# Patient Record
Sex: FEMALE | Race: OTHER | Employment: FULL TIME | URBAN - METROPOLITAN AREA
[De-identification: names, ages, dates, MRNs, and addresses within clinical notes are randomized per-mention and may not be internally consistent; named-entity substitution may affect disease eponyms.]

---

## 2017-03-02 ENCOUNTER — LAB ENCOUNTER (OUTPATIENT)
Dept: LAB | Age: 52
End: 2017-03-02
Attending: FAMILY MEDICINE
Payer: COMMERCIAL

## 2017-03-02 ENCOUNTER — OFFICE VISIT (OUTPATIENT)
Dept: FAMILY MEDICINE CLINIC | Facility: CLINIC | Age: 52
End: 2017-03-02

## 2017-03-02 ENCOUNTER — TELEPHONE (OUTPATIENT)
Dept: FAMILY MEDICINE CLINIC | Facility: CLINIC | Age: 52
End: 2017-03-02

## 2017-03-02 VITALS
WEIGHT: 131 LBS | HEART RATE: 64 BPM | SYSTOLIC BLOOD PRESSURE: 98 MMHG | DIASTOLIC BLOOD PRESSURE: 58 MMHG | BODY MASS INDEX: 24 KG/M2

## 2017-03-02 DIAGNOSIS — G47.00 INSOMNIA, UNSPECIFIED TYPE: ICD-10-CM

## 2017-03-02 DIAGNOSIS — R23.2 HOT FLASHES: ICD-10-CM

## 2017-03-02 DIAGNOSIS — Z13.220 SCREENING, LIPID: ICD-10-CM

## 2017-03-02 DIAGNOSIS — Z63.5 DIVORCE: ICD-10-CM

## 2017-03-02 DIAGNOSIS — F41.9 ANXIETY: ICD-10-CM

## 2017-03-02 DIAGNOSIS — G47.00 INSOMNIA, UNSPECIFIED TYPE: Primary | ICD-10-CM

## 2017-03-02 LAB
ALBUMIN SERPL BCP-MCNC: 3.6 G/DL (ref 3.5–4.8)
ALBUMIN/GLOB SERPL: 1.2 {RATIO} (ref 1–2)
ALP SERPL-CCNC: 47 U/L (ref 32–100)
ALT SERPL-CCNC: 12 U/L (ref 14–54)
ANION GAP SERPL CALC-SCNC: 7 MMOL/L (ref 0–18)
AST SERPL-CCNC: 20 U/L (ref 15–41)
BASOPHILS # BLD: 0 K/UL (ref 0–0.2)
BASOPHILS NFR BLD: 1 %
BILIRUB SERPL-MCNC: 0.6 MG/DL (ref 0.3–1.2)
BUN SERPL-MCNC: 7 MG/DL (ref 8–20)
BUN/CREAT SERPL: 8.6 (ref 10–20)
CALCIUM SERPL-MCNC: 9.2 MG/DL (ref 8.5–10.5)
CHLORIDE SERPL-SCNC: 109 MMOL/L (ref 95–110)
CHOLEST SERPL-MCNC: 183 MG/DL (ref 110–200)
CO2 SERPL-SCNC: 23 MMOL/L (ref 22–32)
CREAT SERPL-MCNC: 0.81 MG/DL (ref 0.5–1.5)
EOSINOPHIL # BLD: 0.2 K/UL (ref 0–0.7)
EOSINOPHIL NFR BLD: 3 %
ERYTHROCYTE [DISTWIDTH] IN BLOOD BY AUTOMATED COUNT: 17.4 % (ref 11–15)
FSH SERPL-ACNC: 34 MIU/ML
GLOBULIN PLAS-MCNC: 2.9 G/DL (ref 2.5–3.7)
GLUCOSE SERPL-MCNC: 79 MG/DL (ref 70–99)
HCT VFR BLD AUTO: 34.7 % (ref 35–48)
HDLC SERPL-MCNC: 71 MG/DL
HGB BLD-MCNC: 10.8 G/DL (ref 12–16)
LDLC SERPL CALC-MCNC: 97 MG/DL (ref 0–99)
LH SERPL-ACNC: 69 MIU/ML
LYMPHOCYTES # BLD: 2 K/UL (ref 1–4)
LYMPHOCYTES NFR BLD: 26 %
MCH RBC QN AUTO: 24.1 PG (ref 27–32)
MCHC RBC AUTO-ENTMCNC: 31.2 G/DL (ref 32–37)
MCV RBC AUTO: 77.2 FL (ref 80–100)
MONOCYTES # BLD: 0.5 K/UL (ref 0–1)
MONOCYTES NFR BLD: 7 %
NEUTROPHILS # BLD AUTO: 5.2 K/UL (ref 1.8–7.7)
NEUTROPHILS NFR BLD: 64 %
NONHDLC SERPL-MCNC: 112 MG/DL
OSMOLALITY UR CALC.SUM OF ELEC: 285 MOSM/KG (ref 275–295)
PLATELET # BLD AUTO: 327 K/UL (ref 140–400)
PMV BLD AUTO: 9.3 FL (ref 7.4–10.3)
POTASSIUM SERPL-SCNC: 3.7 MMOL/L (ref 3.3–5.1)
PROT SERPL-MCNC: 6.5 G/DL (ref 5.9–8.4)
RBC # BLD AUTO: 4.5 M/UL (ref 3.7–5.4)
SODIUM SERPL-SCNC: 139 MMOL/L (ref 136–144)
TRIGL SERPL-MCNC: 74 MG/DL (ref 1–149)
TSH SERPL-ACNC: 0.82 UIU/ML (ref 0.34–5.6)
VIT B12 SERPL-MCNC: 308 PG/ML (ref 181–914)
WBC # BLD AUTO: 8 K/UL (ref 4–11)

## 2017-03-02 PROCEDURE — 85025 COMPLETE CBC W/AUTO DIFF WBC: CPT

## 2017-03-02 PROCEDURE — 83001 ASSAY OF GONADOTROPIN (FSH): CPT

## 2017-03-02 PROCEDURE — 82607 VITAMIN B-12: CPT

## 2017-03-02 PROCEDURE — 84443 ASSAY THYROID STIM HORMONE: CPT

## 2017-03-02 PROCEDURE — 82306 VITAMIN D 25 HYDROXY: CPT

## 2017-03-02 PROCEDURE — 99212 OFFICE O/P EST SF 10 MIN: CPT | Performed by: FAMILY MEDICINE

## 2017-03-02 PROCEDURE — 99213 OFFICE O/P EST LOW 20 MIN: CPT | Performed by: FAMILY MEDICINE

## 2017-03-02 PROCEDURE — 80061 LIPID PANEL: CPT

## 2017-03-02 PROCEDURE — 80053 COMPREHEN METABOLIC PANEL: CPT

## 2017-03-02 PROCEDURE — 36415 COLL VENOUS BLD VENIPUNCTURE: CPT

## 2017-03-02 PROCEDURE — 82671 ASSAY OF ESTROGENS: CPT

## 2017-03-02 PROCEDURE — 83002 ASSAY OF GONADOTROPIN (LH): CPT

## 2017-03-02 RX ORDER — ALPRAZOLAM 0.25 MG/1
0.25 TABLET ORAL NIGHTLY PRN
Qty: 30 TABLET | Refills: 1 | Status: SHIPPED | OUTPATIENT
Start: 2017-03-02 | End: 2018-01-23 | Stop reason: ALTCHOICE

## 2017-03-02 SDOH — SOCIAL STABILITY - SOCIAL INSECURITY: DISRUPTION OF FAMILY BY SEPARATION AND DIVORCE: Z63.5

## 2017-03-02 NOTE — PROGRESS NOTES
Dennys Zimmerman is a 54year old female. Patient presents with: Anxiety    HPI:   Reports not exercising regularly. Never went for labs - plans to go today. Reports bad side effects with the effexor - did not like it. Did have her menses last month.  Reports

## 2017-03-02 NOTE — TELEPHONE ENCOUNTER
Dr. Dale Thomson,              I received your navigation order for behavioral health services. I have reached out to your patient and left a message with my contact info.  I will continue my outreach and update you on the progress.               ANTONIO Vaughn

## 2017-03-06 LAB
25(OH)D3 SERPL-MCNC: 14.6 NG/ML
ESTRADIOL BY TMS: 251 PG/ML
ESTROGENS TOTAL CALCULATION: 358 PG/ML
ESTRONE BY TMS: 107 PG/ML

## 2017-03-08 ENCOUNTER — TELEPHONE (OUTPATIENT)
Dept: FAMILY MEDICINE CLINIC | Facility: CLINIC | Age: 52
End: 2017-03-08

## 2017-03-08 RX ORDER — FERROUS SULFATE 325(65) MG
325 TABLET ORAL
Qty: 30 TABLET | Refills: 5 | Status: SHIPPED | OUTPATIENT
Start: 2017-03-08 | End: 2018-01-31

## 2017-03-08 RX ORDER — CHOLECALCIFEROL (VITAMIN D3) 1250 MCG
1 CAPSULE ORAL WEEKLY
Qty: 4 CAPSULE | Refills: 5 | Status: SHIPPED | OUTPATIENT
Start: 2017-03-08 | End: 2018-01-31

## 2017-03-08 NOTE — TELEPHONE ENCOUNTER
----- Message from Jennifer Brizuela MD sent at 3/8/2017  1:14 PM CST -----  Hormone levels are normal - pt is not in menopause. Still anemic. Should be taking ferrous sulfate daily to increase levels and still low vit d - restart weekly vitamin D.  Rest of

## 2017-03-08 NOTE — PROGRESS NOTES
Quick Note:    Hormone levels are normal - pt is not in menopause. Still anemic. Should be taking ferrous sulfate daily to increase levels and still low vit d - restart weekly vitamin D.  Rest of labs were normal. Normal cholesterol.  ______

## 2017-12-11 ENCOUNTER — HOSPITAL ENCOUNTER (OUTPATIENT)
Age: 52
Discharge: HOME | End: 2017-12-11
Attending: FAMILY MEDICINE
Payer: COMMERCIAL

## 2017-12-11 ENCOUNTER — APPOINTMENT (OUTPATIENT)
Dept: GENERAL RADIOLOGY | Age: 52
End: 2017-12-11
Attending: FAMILY MEDICINE
Payer: COMMERCIAL

## 2017-12-11 VITALS
SYSTOLIC BLOOD PRESSURE: 119 MMHG | TEMPERATURE: 98 F | WEIGHT: 130 LBS | OXYGEN SATURATION: 100 % | BODY MASS INDEX: 24.55 KG/M2 | HEIGHT: 61 IN | RESPIRATION RATE: 18 BRPM | HEART RATE: 74 BPM | DIASTOLIC BLOOD PRESSURE: 77 MMHG

## 2017-12-11 DIAGNOSIS — N30.00 ACUTE CYSTITIS WITHOUT HEMATURIA: Primary | ICD-10-CM

## 2017-12-11 DIAGNOSIS — R68.89 FLU-LIKE SYMPTOMS: ICD-10-CM

## 2017-12-11 DIAGNOSIS — R05.9 COUGH: ICD-10-CM

## 2017-12-11 PROCEDURE — 71020 XR CHEST PA + LAT CHEST (CPT=71020): CPT | Performed by: FAMILY MEDICINE

## 2017-12-11 PROCEDURE — 81002 URINALYSIS NONAUTO W/O SCOPE: CPT

## 2017-12-11 PROCEDURE — 87186 SC STD MICRODIL/AGAR DIL: CPT | Performed by: FAMILY MEDICINE

## 2017-12-11 PROCEDURE — 87077 CULTURE AEROBIC IDENTIFY: CPT | Performed by: FAMILY MEDICINE

## 2017-12-11 PROCEDURE — 99204 OFFICE O/P NEW MOD 45 MIN: CPT

## 2017-12-11 PROCEDURE — 99214 OFFICE O/P EST MOD 30 MIN: CPT

## 2017-12-11 PROCEDURE — 87086 URINE CULTURE/COLONY COUNT: CPT | Performed by: FAMILY MEDICINE

## 2017-12-11 RX ORDER — NITROFURANTOIN 25; 75 MG/1; MG/1
100 CAPSULE ORAL 2 TIMES DAILY
Qty: 14 CAPSULE | Refills: 0 | Status: SHIPPED | OUTPATIENT
Start: 2017-12-11 | End: 2017-12-18

## 2017-12-11 NOTE — ED INITIAL ASSESSMENT (HPI)
REPORTS CHILLS, BODY ACHES, INTERMITTENT HEADACHES AND \"FEELING TIRED\" SINCE Friday. DENIES SORE THROAT. DENIES COUGH.  + POOR APPETITE.

## 2017-12-11 NOTE — ED PROVIDER NOTES
Patient Seen in: [facility]    History   Patient presents with:   Body ache and/or chills  Headache (neurologic)    Stated Complaint: CHILLS,BODYACHES    HPI    45-year-old female presents to the clinic today with chief co reviewed and negative except as noted above.     Physical Exam   ED Triage Vitals [12/11/17 1338]  BP: 119/77  Pulse: 74  Resp: 18  Temp: 97.7 °F (36.5 °C)  Temp src: Oral  SpO2: 100 %  O2 Device: None (Room air)    Current:/77   Pulse 74   Temp 97.7 MEDIAST/LOUISE: No visible mass or adenopathy. LUNGS/PLEURA: Normal.  No significant pulmonary parenchymal abnormalities. No effusion or pleural thickening. BONES: There is mild scoliosis. OTHER: Negative. Dictated by (CST):  Benjamin Em MD on 12/1

## 2018-01-23 ENCOUNTER — OFFICE VISIT (OUTPATIENT)
Dept: FAMILY MEDICINE CLINIC | Facility: CLINIC | Age: 53
End: 2018-01-23

## 2018-01-23 VITALS
HEIGHT: 61.5 IN | WEIGHT: 130.81 LBS | HEART RATE: 73 BPM | DIASTOLIC BLOOD PRESSURE: 68 MMHG | BODY MASS INDEX: 24.38 KG/M2 | SYSTOLIC BLOOD PRESSURE: 113 MMHG

## 2018-01-23 DIAGNOSIS — Z12.31 BREAST CANCER SCREENING BY MAMMOGRAM: ICD-10-CM

## 2018-01-23 DIAGNOSIS — R23.2 HOT FLASHES: ICD-10-CM

## 2018-01-23 DIAGNOSIS — Z12.11 SCREEN FOR COLON CANCER: ICD-10-CM

## 2018-01-23 DIAGNOSIS — Z00.00 ROUTINE MEDICAL EXAM: Primary | ICD-10-CM

## 2018-01-23 PROCEDURE — 90471 IMMUNIZATION ADMIN: CPT | Performed by: FAMILY MEDICINE

## 2018-01-23 PROCEDURE — 99396 PREV VISIT EST AGE 40-64: CPT | Performed by: FAMILY MEDICINE

## 2018-01-23 PROCEDURE — 90715 TDAP VACCINE 7 YRS/> IM: CPT | Performed by: FAMILY MEDICINE

## 2018-01-23 RX ORDER — FLUOXETINE 20 MG/1
20 TABLET, FILM COATED ORAL EVERY EVENING
Qty: 30 TABLET | Refills: 2 | Status: SHIPPED | OUTPATIENT
Start: 2018-01-23 | End: 2018-05-15

## 2018-01-23 NOTE — PROGRESS NOTES
HPI:   Aurea Singer is a 50year old female who presents for a complete physical exam.    Reports for about a month had problems with insomnia and had a lot sweats in the evenings. LMP about a month. Been skipping months.  Reports the sweats are horrible bruising  ENDOCRINE: denies weight changes  ALL/ASTHMA: denies hx of allergy or asthma    EXAM:   /68 (BP Location: Left arm)   Pulse 73   Ht 5' 1.5\" (1.562 m)   Wt 130 lb 12.8 oz (59.3 kg)   LMP 12/23/2017 (Approximate)   BMI 24.31 kg/m²     GENERA

## 2018-01-27 ENCOUNTER — LAB ENCOUNTER (OUTPATIENT)
Dept: LAB | Age: 53
End: 2018-01-27
Attending: FAMILY MEDICINE
Payer: COMMERCIAL

## 2018-01-27 DIAGNOSIS — Z00.00 ROUTINE MEDICAL EXAM: ICD-10-CM

## 2018-01-27 LAB
ALBUMIN SERPL BCP-MCNC: 3.7 G/DL (ref 3.5–4.8)
ALBUMIN/GLOB SERPL: 1.2 {RATIO} (ref 1–2)
ALP SERPL-CCNC: 54 U/L (ref 32–100)
ALT SERPL-CCNC: 14 U/L (ref 14–54)
ANION GAP SERPL CALC-SCNC: 6 MMOL/L (ref 0–18)
AST SERPL-CCNC: 21 U/L (ref 15–41)
BASOPHILS # BLD: 0 K/UL (ref 0–0.2)
BASOPHILS NFR BLD: 1 %
BILIRUB SERPL-MCNC: 0.4 MG/DL (ref 0.3–1.2)
BUN SERPL-MCNC: 7 MG/DL (ref 8–20)
BUN/CREAT SERPL: 11.3 (ref 10–20)
CALCIUM SERPL-MCNC: 9 MG/DL (ref 8.5–10.5)
CHLORIDE SERPL-SCNC: 110 MMOL/L (ref 95–110)
CHOLEST SERPL-MCNC: 184 MG/DL (ref 110–200)
CO2 SERPL-SCNC: 23 MMOL/L (ref 22–32)
CREAT SERPL-MCNC: 0.62 MG/DL (ref 0.5–1.5)
EOSINOPHIL # BLD: 0.3 K/UL (ref 0–0.7)
EOSINOPHIL NFR BLD: 5 %
ERYTHROCYTE [DISTWIDTH] IN BLOOD BY AUTOMATED COUNT: 17.7 % (ref 11–15)
GLOBULIN PLAS-MCNC: 3 G/DL (ref 2.5–3.7)
GLUCOSE SERPL-MCNC: 76 MG/DL (ref 70–99)
HCT VFR BLD AUTO: 35.1 % (ref 35–48)
HDLC SERPL-MCNC: 73 MG/DL
HGB BLD-MCNC: 11 G/DL (ref 12–16)
LDLC SERPL CALC-MCNC: 99 MG/DL (ref 0–99)
LYMPHOCYTES # BLD: 2.2 K/UL (ref 1–4)
LYMPHOCYTES NFR BLD: 45 %
MCH RBC QN AUTO: 24.5 PG (ref 27–32)
MCHC RBC AUTO-ENTMCNC: 31.5 G/DL (ref 32–37)
MCV RBC AUTO: 77.8 FL (ref 80–100)
MONOCYTES # BLD: 0.4 K/UL (ref 0–1)
MONOCYTES NFR BLD: 9 %
NEUTROPHILS # BLD AUTO: 2 K/UL (ref 1.8–7.7)
NEUTROPHILS NFR BLD: 41 %
NONHDLC SERPL-MCNC: 111 MG/DL
OSMOLALITY UR CALC.SUM OF ELEC: 285 MOSM/KG (ref 275–295)
PLATELET # BLD AUTO: 306 K/UL (ref 140–400)
PMV BLD AUTO: 9.9 FL (ref 7.4–10.3)
POTASSIUM SERPL-SCNC: 3.8 MMOL/L (ref 3.3–5.1)
PROT SERPL-MCNC: 6.7 G/DL (ref 5.9–8.4)
RBC # BLD AUTO: 4.51 M/UL (ref 3.7–5.4)
SODIUM SERPL-SCNC: 139 MMOL/L (ref 136–144)
TRIGL SERPL-MCNC: 62 MG/DL (ref 1–149)
TSH SERPL-ACNC: 1.04 UIU/ML (ref 0.45–5.33)
VIT B12 SERPL-MCNC: 291 PG/ML (ref 181–914)
WBC # BLD AUTO: 5 K/UL (ref 4–11)

## 2018-01-27 PROCEDURE — 80061 LIPID PANEL: CPT

## 2018-01-27 PROCEDURE — 84443 ASSAY THYROID STIM HORMONE: CPT

## 2018-01-27 PROCEDURE — 36415 COLL VENOUS BLD VENIPUNCTURE: CPT

## 2018-01-27 PROCEDURE — 82607 VITAMIN B-12: CPT

## 2018-01-27 PROCEDURE — 82306 VITAMIN D 25 HYDROXY: CPT

## 2018-01-27 PROCEDURE — 85025 COMPLETE CBC W/AUTO DIFF WBC: CPT

## 2018-01-27 PROCEDURE — 80053 COMPREHEN METABOLIC PANEL: CPT

## 2018-01-29 ENCOUNTER — OFFICE VISIT (OUTPATIENT)
Dept: OBGYN CLINIC | Facility: CLINIC | Age: 53
End: 2018-01-29

## 2018-01-29 VITALS
DIASTOLIC BLOOD PRESSURE: 67 MMHG | HEIGHT: 62 IN | HEART RATE: 78 BPM | WEIGHT: 130 LBS | SYSTOLIC BLOOD PRESSURE: 106 MMHG | BODY MASS INDEX: 23.92 KG/M2

## 2018-01-29 DIAGNOSIS — N95.1 SYMPTOMATIC MENOPAUSAL OR FEMALE CLIMACTERIC STATES: ICD-10-CM

## 2018-01-29 DIAGNOSIS — Z01.419 ENCOUNTER FOR GYNECOLOGICAL EXAMINATION WITHOUT ABNORMAL FINDING: Primary | ICD-10-CM

## 2018-01-29 LAB — 25(OH)D3 SERPL-MCNC: 14.7 NG/ML

## 2018-01-29 PROCEDURE — 99396 PREV VISIT EST AGE 40-64: CPT | Performed by: OBSTETRICS & GYNECOLOGY

## 2018-01-29 NOTE — PROGRESS NOTES
Well Woman Exam    HPI:  The patient is a 54yo female who presents for annual exam. She states she has started to have night sweats about three times a night and it is hard to sleep. She has hot flushes during the day about 2 times.  She states it is more t Disp: 4 capsule, Rfl: 5  •  Ferrous Sulfate 325 (65 Fe) MG Oral Tab, Take 1 tablet (325 mg total) by mouth daily with breakfast., Disp: 30 tablet, Rfl: 5    ALLERGIES:  No Known Allergies      Review of Systems:  Constitutional:  Denies fevers and chills 2019  2. Menopause  1. Reviewed symptoms and periods may be irregular  2. Discussed HRT as well as other medications  3. Plan for patient to try exercise, fans, layers and return in three months. Can discuss HRT at that time again if needed.    3. Breast He

## 2018-01-31 RX ORDER — CHOLECALCIFEROL (VITAMIN D3) 1250 MCG
1 CAPSULE ORAL WEEKLY
Qty: 4 CAPSULE | Refills: 5 | Status: SHIPPED | OUTPATIENT
Start: 2018-01-31 | End: 2019-06-01 | Stop reason: ALTCHOICE

## 2018-01-31 RX ORDER — FERROUS SULFATE 325(65) MG
325 TABLET ORAL
Qty: 30 TABLET | Refills: 5 | Status: SHIPPED | OUTPATIENT
Start: 2018-01-31 | End: 2019-06-01 | Stop reason: ALTCHOICE

## 2018-01-31 NOTE — PROGRESS NOTES
Labs are normal except for low vitamin D levels. Should take over the counter vitamin D 50,000 units weekly again because continues to go low- when done with weekly should continue with daily supplements otc. Also mild anemia still present.  Should schedule

## 2018-02-01 ENCOUNTER — OFFICE VISIT (OUTPATIENT)
Dept: GASTROENTEROLOGY | Facility: CLINIC | Age: 53
End: 2018-02-01

## 2018-02-01 ENCOUNTER — TELEPHONE (OUTPATIENT)
Dept: GASTROENTEROLOGY | Facility: CLINIC | Age: 53
End: 2018-02-01

## 2018-02-01 ENCOUNTER — APPOINTMENT (OUTPATIENT)
Dept: LAB | Facility: HOSPITAL | Age: 53
End: 2018-02-01
Attending: PHYSICIAN ASSISTANT
Payer: COMMERCIAL

## 2018-02-01 VITALS
HEIGHT: 61.5 IN | HEART RATE: 60 BPM | DIASTOLIC BLOOD PRESSURE: 78 MMHG | BODY MASS INDEX: 24.6 KG/M2 | WEIGHT: 132 LBS | SYSTOLIC BLOOD PRESSURE: 115 MMHG

## 2018-02-01 DIAGNOSIS — D64.9 ANEMIA, UNSPECIFIED TYPE: Primary | ICD-10-CM

## 2018-02-01 DIAGNOSIS — D64.9 ANEMIA, UNSPECIFIED TYPE: ICD-10-CM

## 2018-02-01 DIAGNOSIS — Z86.19 HISTORY OF HELICOBACTER PYLORI INFECTION: ICD-10-CM

## 2018-02-01 LAB
FERRITIN SERPL IA-MCNC: 3 NG/ML (ref 11–307)
IRON SATN MFR SERPL: 7 % (ref 15–50)
IRON SERPL-MCNC: 35 MCG/DL (ref 28–170)
TIBC SERPL-MCNC: 499 MCG/DL (ref 228–428)
TRANSFERRIN SERPL-MCNC: 378 MG/DL (ref 192–382)

## 2018-02-01 PROCEDURE — 36415 COLL VENOUS BLD VENIPUNCTURE: CPT

## 2018-02-01 PROCEDURE — 82728 ASSAY OF FERRITIN: CPT

## 2018-02-01 PROCEDURE — 99212 OFFICE O/P EST SF 10 MIN: CPT | Performed by: PHYSICIAN ASSISTANT

## 2018-02-01 PROCEDURE — 83540 ASSAY OF IRON: CPT

## 2018-02-01 PROCEDURE — 84466 ASSAY OF TRANSFERRIN: CPT

## 2018-02-01 PROCEDURE — 99244 OFF/OP CNSLTJ NEW/EST MOD 40: CPT | Performed by: PHYSICIAN ASSISTANT

## 2018-02-01 NOTE — H&P
[address] [facility] [address] Gastroenterology                                                                                                  Clinic History and Physical     Pa apnea. Pertinent Family Hx:  - No family hx of esophageal, gastric or colon cancer  - No family history of IBD.     Prior endoscopies:  - Bi-directional 12/2005 with Dr. Darylene Ear as above     Social Hx:  - 1-2 cigarettes each day/Social ETOH   - Denies illi hematuria  INTEGUMENT/BREAST:  SKIN:  negative for jaundice   ALLERGIC/IMMUNOLOGIC:  negative for hay fever  ENDOCRINE:  negative for cold intolerance and heat intolerance  MUSCULOSKELETAL:  negative for joint effusion/severe erythema  BEHAVIOR/PSYCH:  neg time gynecologic intervention was recommended. # Screening Colonoscopy: Patient is currently asymptomatic and denies diarrhea, hematochezia, thin-stools or weight loss.  We discussed risks/benefits/alternatives to procedure, including CT colonography an Sulfate-K Sulfate-Mg Sulf (SUPREP BOWEL PREP KIT) 17.5-3.13-1.6 GM/180ML Oral Solution 1 Bottle 0      Sig: Take as directed           Imaging & Referrals:  None     CC: Dr. Jim Stockton.  Bon Farmer PA-C  2/1/2018

## 2018-02-01 NOTE — TELEPHONE ENCOUNTER
Scheduled for:  Colonoscopy 22470  Provider Name: Dr. Dav Arndt  Date:  2/20/18  Location:  [facility]  Sedation:  IV   Time:  9:45 am, arrival 8:45 am  Prep: Suprep  Meds/Allergies Reconciled?:  Vinnie Stephens PA-C reviewed, 3. Hold IRON for 7 days before the procedure.

## 2018-02-01 NOTE — PATIENT INSTRUCTIONS
1. Schedule colonoscopy with Dr. Bernadine Salinas with IV twilight sedation. 2.  bowel prep from pharmacy - I have prescribed Suprep. This is a smaller volume preparation.  If this is too expensive, however, please call my office and we will order an alterna

## 2018-02-02 ENCOUNTER — APPOINTMENT (OUTPATIENT)
Dept: LAB | Facility: HOSPITAL | Age: 53
End: 2018-02-02
Attending: PHYSICIAN ASSISTANT
Payer: COMMERCIAL

## 2018-02-02 DIAGNOSIS — Z86.19 HISTORY OF HELICOBACTER PYLORI INFECTION: ICD-10-CM

## 2018-02-02 DIAGNOSIS — D64.9 ANEMIA, UNSPECIFIED TYPE: ICD-10-CM

## 2018-02-02 PROCEDURE — 87338 HPYLORI STOOL AG IA: CPT

## 2018-02-03 ENCOUNTER — TELEPHONE (OUTPATIENT)
Dept: OTHER | Age: 53
End: 2018-02-03

## 2018-02-04 ENCOUNTER — TELEPHONE (OUTPATIENT)
Dept: GASTROENTEROLOGY | Facility: CLINIC | Age: 53
End: 2018-02-04

## 2018-02-04 LAB — HELICOBACTER PYLORI AG, FECAL: NEGATIVE

## 2018-02-05 NOTE — TELEPHONE ENCOUNTER
Negative stool test for H. Pylori. She had prior H. Pylori infection without re-check, therefore this is very reassuring. Please let patient know.

## 2018-02-20 ENCOUNTER — SURGERY (OUTPATIENT)
Age: 53
End: 2018-02-20

## 2018-02-20 ENCOUNTER — HOSPITAL ENCOUNTER (OUTPATIENT)
Facility: HOSPITAL | Age: 53
Setting detail: HOSPITAL OUTPATIENT SURGERY
Discharge: HOME | End: 2018-02-20
Attending: INTERNAL MEDICINE | Admitting: INTERNAL MEDICINE
Payer: COMMERCIAL

## 2018-02-20 DIAGNOSIS — D64.9 ANEMIA, UNSPECIFIED TYPE: ICD-10-CM

## 2018-02-20 LAB — B-HCG UR QL: NEGATIVE

## 2018-02-20 PROCEDURE — G0500 MOD SEDAT ENDO SERVICE >5YRS: HCPCS | Performed by: INTERNAL MEDICINE

## 2018-02-20 PROCEDURE — 99153 MOD SED SAME PHYS/QHP EA: CPT | Performed by: INTERNAL MEDICINE

## 2018-02-20 PROCEDURE — 0DJD8ZZ INSPECTION OF LOWER INTESTINAL TRACT, VIA NATURAL OR ARTIFICIAL OPENING ENDOSCOPIC: ICD-10-PCS | Performed by: INTERNAL MEDICINE

## 2018-02-20 PROCEDURE — 45378 DIAGNOSTIC COLONOSCOPY: CPT | Performed by: INTERNAL MEDICINE

## 2018-02-20 RX ORDER — SODIUM CHLORIDE, SODIUM LACTATE, POTASSIUM CHLORIDE, CALCIUM CHLORIDE 600; 310; 30; 20 MG/100ML; MG/100ML; MG/100ML; MG/100ML
INJECTION, SOLUTION INTRAVENOUS CONTINUOUS
Status: DISCONTINUED | OUTPATIENT
Start: 2018-02-20 | End: 2018-02-20

## 2018-02-20 RX ORDER — SODIUM CHLORIDE 0.9 % (FLUSH) 0.9 %
10 SYRINGE (ML) INJECTION AS NEEDED
Status: DISCONTINUED | OUTPATIENT
Start: 2018-02-20 | End: 2018-02-20

## 2018-02-20 RX ORDER — MIDAZOLAM HYDROCHLORIDE 1 MG/ML
INJECTION INTRAMUSCULAR; INTRAVENOUS
Status: DISCONTINUED | OUTPATIENT
Start: 2018-02-20 | End: 2018-02-20

## 2018-02-20 RX ORDER — MIDAZOLAM HYDROCHLORIDE 1 MG/ML
1 INJECTION INTRAMUSCULAR; INTRAVENOUS EVERY 5 MIN PRN
Status: DISCONTINUED | OUTPATIENT
Start: 2018-02-20 | End: 2018-02-20

## 2018-02-20 NOTE — OPERATIVE REPORT
COLONOSCOPY REPORT    Jess Ayala     1965 Age 50year old   PCP Aviva Llanes.  Genia Kincaid DO Endoscopist Lydia Irizarry MD     Date of procedure: 18    Procedure: Colonoscopy     Pre-operative diagnosis: Screening    Post-operative diagn internal hemorrhoids. 5. The colonic mucosa throughout the colon showed normal vascular pattern, without evidence of angioectasias or inflammation. Tortuous sigmoid colon. 6. JAVIER: normal rectal tone, no masses palpated.      Impression:   · Tortuous c

## 2018-02-20 NOTE — H&P
History & Physical Examination    Patient Name: Todd Higgins  MRN: [id number]  [facility]: [id number]  YOB: 1965    Diagnosis: screening for colon cancer      Prescriptions Prior to Admission:  Cholecalciferol (VITAMIN D3) 66708 units Oral Cap Ta History and Physical done within the last 30 days. Any changes noted above.     Radha Rae, [facility] Gastroenter[facility]  2/20/2018  9:26 AM

## 2018-02-21 VITALS
HEIGHT: 62 IN | HEART RATE: 69 BPM | WEIGHT: 128 LBS | DIASTOLIC BLOOD PRESSURE: 75 MMHG | BODY MASS INDEX: 23.55 KG/M2 | SYSTOLIC BLOOD PRESSURE: 107 MMHG | RESPIRATION RATE: 14 BRPM | OXYGEN SATURATION: 99 %

## 2018-05-15 RX ORDER — FLUOXETINE 20 MG/1
TABLET, FILM COATED ORAL
Qty: 30 TABLET | Refills: 2 | Status: SHIPPED | OUTPATIENT
Start: 2018-05-15 | End: 2018-09-01

## 2018-05-24 RX ORDER — FLUOXETINE 20 MG/1
TABLET, FILM COATED ORAL
Qty: 30 TABLET | Refills: 1 | OUTPATIENT
Start: 2018-05-24

## 2018-05-24 NOTE — TELEPHONE ENCOUNTER
[facility], [phone]   Medication Detail      Disp Refills Start End    FLUOXETINE HCL 20 MG Oral Tab 30 tablet 2 5/15/2018     Sig: TAKE ONE TABLET BY MOUTH ONE TIME DAILY IN THE EVENING

## 2018-09-01 RX ORDER — FLUOXETINE 20 MG/1
TABLET, FILM COATED ORAL
Qty: 90 TABLET | Refills: 1 | Status: SHIPPED | OUTPATIENT
Start: 2018-09-01 | End: 2019-03-16

## 2019-03-16 ENCOUNTER — HOSPITAL ENCOUNTER (OUTPATIENT)
Age: 54
Discharge: HOME | End: 2019-03-16
Payer: COMMERCIAL

## 2019-03-16 ENCOUNTER — APPOINTMENT (OUTPATIENT)
Dept: GENERAL RADIOLOGY | Age: 54
End: 2019-03-16
Attending: NURSE PRACTITIONER
Payer: COMMERCIAL

## 2019-03-16 VITALS
WEIGHT: 125 LBS | OXYGEN SATURATION: 98 % | HEART RATE: 66 BPM | SYSTOLIC BLOOD PRESSURE: 107 MMHG | BODY MASS INDEX: 24.54 KG/M2 | TEMPERATURE: 98 F | HEIGHT: 60 IN | RESPIRATION RATE: 18 BRPM | DIASTOLIC BLOOD PRESSURE: 78 MMHG

## 2019-03-16 DIAGNOSIS — S39.012A BACK STRAIN, INITIAL ENCOUNTER: Primary | ICD-10-CM

## 2019-03-16 PROCEDURE — 72072 X-RAY EXAM THORAC SPINE 3VWS: CPT | Performed by: NURSE PRACTITIONER

## 2019-03-16 PROCEDURE — 99213 OFFICE O/P EST LOW 20 MIN: CPT

## 2019-03-16 PROCEDURE — 99214 OFFICE O/P EST MOD 30 MIN: CPT

## 2019-03-16 RX ORDER — NAPROXEN 500 MG/1
500 TABLET ORAL 2 TIMES DAILY PRN
Qty: 20 TABLET | Refills: 0 | Status: SHIPPED | OUTPATIENT
Start: 2019-03-16 | End: 2019-03-23

## 2019-03-16 RX ORDER — CYCLOBENZAPRINE HCL 10 MG
10 TABLET ORAL 3 TIMES DAILY PRN
Qty: 20 TABLET | Refills: 0 | Status: SHIPPED | OUTPATIENT
Start: 2019-03-16 | End: 2019-03-23

## 2019-03-16 NOTE — ED INITIAL ASSESSMENT (HPI)
REPORTS RIGHT UPPER BACK PAIN THAN RADIATES TO HER RIGHT ARM FOR THE PAST WEEK. STATES SHE DOES REPETITIVE MOTIONS WITH HER JOB. DENIES NUMBNESS/TINGLING TO RIGHT ARM/HAND. TAKING IBUPROFEN WITHOUT RELIEF IN SYMPTOMS.

## 2019-03-16 NOTE — ED PROVIDER NOTES
Patient presents with:  Back Pain (musculoskeletal)      HPI:     Miriam Eaton is a 48year old female who presents for evaluation and management of a chief complaint of middle and right-sided upper back pain that started approximately 3 weeks ago.   The Alcohol/week: 0.0 oz        Comment: occ.       Drug use: No      Sexual activity: Yes        Partners: Male        Birth control/protection: Tubal Ligation    Lifestyle      Physical activity:        Days per week: Not on file        Minutes per session: N Wt 56.7 kg   SpO2 98%   BMI 24.41 kg/m²   GENERAL: well developed, well nourished, well hydrated, no distress  SKIN: good skin turgor, no obvious rashes  HEENT: atraumatic, normocephalic, ears, nose and throat are clear  EYES: sclera non icteric bilatera close follow-up with her primary care doctor. Diagnosis:    ICD-10-CM    1. Back strain, initial encounter S39.012A            All results reviewed and discussed with patient. See AVS for detailed discharge instructions for your condition today.     Ana Coyle

## 2019-03-18 RX ORDER — FLUOXETINE 20 MG/1
TABLET, FILM COATED ORAL
Qty: 90 TABLET | Refills: 1 | Status: SHIPPED | OUTPATIENT
Start: 2019-03-18 | End: 2019-04-01

## 2019-03-25 ENCOUNTER — NURSE TRIAGE (OUTPATIENT)
Dept: FAMILY MEDICINE CLINIC | Facility: CLINIC | Age: 54
End: 2019-03-25

## 2019-03-25 NOTE — TELEPHONE ENCOUNTER
Action Requested: Summary for Provider     []  Critical Lab, Recommendations Needed  [] Need Additional Advice  []   FYI    []   Need Orders  [] Need Medications Sent to Pharmacy  []  Other     SUMMARY: patient c/o upper back pain between the shoulder blad

## 2019-03-25 NOTE — ED NOTES
[facility] calling asking for medical records regarding recent visit/xray. Call transferred to medical records.

## 2019-03-29 ENCOUNTER — HOSPITAL ENCOUNTER (OUTPATIENT)
Age: 54
Discharge: HOME | End: 2019-03-29
Attending: EMERGENCY MEDICINE
Payer: COMMERCIAL

## 2019-03-29 VITALS
DIASTOLIC BLOOD PRESSURE: 72 MMHG | SYSTOLIC BLOOD PRESSURE: 114 MMHG | WEIGHT: 130 LBS | HEART RATE: 73 BPM | HEIGHT: 60 IN | RESPIRATION RATE: 16 BRPM | OXYGEN SATURATION: 98 % | BODY MASS INDEX: 25.52 KG/M2 | TEMPERATURE: 98 F

## 2019-03-29 DIAGNOSIS — S39.012A BACK STRAIN, INITIAL ENCOUNTER: Primary | ICD-10-CM

## 2019-03-29 PROCEDURE — 99214 OFFICE O/P EST MOD 30 MIN: CPT

## 2019-03-29 PROCEDURE — 99213 OFFICE O/P EST LOW 20 MIN: CPT

## 2019-03-29 RX ORDER — DIAZEPAM 5 MG/1
5 TABLET ORAL EVERY 6 HOURS PRN
Qty: 20 TABLET | Refills: 0 | Status: SHIPPED | OUTPATIENT
Start: 2019-03-29 | End: 2019-04-05

## 2019-03-29 NOTE — ED PROVIDER NOTES
Patient Seen in: [facility] In [address] [place] [address]    History   Patient presents with:  Back Pain (musculoskeletal)    Stated Complaint: back pain    HPI    49 yo female with several weeks of upper back pain that she thinks started after lifti Cardiovascular: Normal rate, regular rhythm, normal heart sounds and intact distal pulses. Pulmonary/Chest: Effort normal and breath sounds normal.   Abdominal: Soft. Bowel sounds are normal. She exhibits no distension and no mass.  There is no tenderne

## 2019-03-29 NOTE — ED NOTES
Take po meds only at night when not working. Motrin or otc pain meds when at work,. Call your pcp and make f/u appointment nelson if not improving for possible PT or a MRI if MD feels she needs them. verbalized D? Willie Real

## 2019-03-29 NOTE — ED INITIAL ASSESSMENT (HPI)
Went tp IC in [address] last week given meds for pain and muscle relaxer and pain is not better- unable to see pcp pain is worse and went to chiropractor and is not better no trauma

## 2019-04-01 ENCOUNTER — OFFICE VISIT (OUTPATIENT)
Dept: FAMILY MEDICINE CLINIC | Facility: CLINIC | Age: 54
End: 2019-04-01
Payer: COMMERCIAL

## 2019-04-01 VITALS
DIASTOLIC BLOOD PRESSURE: 83 MMHG | HEIGHT: 61.5 IN | BODY MASS INDEX: 24.98 KG/M2 | RESPIRATION RATE: 16 BRPM | HEART RATE: 89 BPM | WEIGHT: 134 LBS | SYSTOLIC BLOOD PRESSURE: 122 MMHG | TEMPERATURE: 97 F

## 2019-04-01 DIAGNOSIS — R23.2 HOT FLASHES: Primary | ICD-10-CM

## 2019-04-01 DIAGNOSIS — M54.9 UPPER BACK PAIN: ICD-10-CM

## 2019-04-01 PROCEDURE — 99214 OFFICE O/P EST MOD 30 MIN: CPT | Performed by: FAMILY MEDICINE

## 2019-04-01 PROCEDURE — 99212 OFFICE O/P EST SF 10 MIN: CPT | Performed by: FAMILY MEDICINE

## 2019-04-01 RX ORDER — FLUOXETINE 20 MG/1
TABLET, FILM COATED ORAL
Qty: 90 TABLET | Refills: 1 | Status: SHIPPED | OUTPATIENT
Start: 2019-04-01 | End: 2019-06-01 | Stop reason: ALTCHOICE

## 2019-04-01 RX ORDER — MELOXICAM 15 MG/1
15 TABLET ORAL DAILY
Qty: 30 TABLET | Refills: 0 | Status: SHIPPED | OUTPATIENT
Start: 2019-04-01 | End: 2019-06-01 | Stop reason: ALTCHOICE

## 2019-04-01 NOTE — PROGRESS NOTES
Aurea Singer is a 48year old female. Patient presents with:  Pain: Patient present for follow up for pain in back    HPI:   Seen in urgent care 3/16 and 3/29 for back pain.  Notes from first visit  Augustus Eaton is a 48year old female who presents Medications on File Prior to Visit:  diazepam 5 MG Oral Tab Take 1 tablet (5 mg total) by mouth every 6 (six) hours as needed (muscle spasm or stiffness).  Disp: 20 tablet Rfl: 0   FLUOXETINE HCL 20 MG Oral Tab TAKE 1 TABLET BY MOUTH IN THE EVENING Disp: 90 2. Upper back pain  PT. Continue diazepam in evenings and added meloxicam. Note for work. The patient indicates understanding of these issues and agrees to the plan.       Huy Arredondo MD  4/1/2019  10:01 AM

## 2019-05-23 ENCOUNTER — TELEPHONE (OUTPATIENT)
Dept: CASE MANAGEMENT | Age: 54
End: 2019-05-23

## 2019-05-31 ENCOUNTER — NURSE TRIAGE (OUTPATIENT)
Dept: FAMILY MEDICINE CLINIC | Facility: CLINIC | Age: 54
End: 2019-05-31

## 2019-05-31 NOTE — TELEPHONE ENCOUNTER
Action Requested: Summary for Provider     []  Critical Lab, Recommendations Needed  [] Need Additional Advice  [x]   FYI    []   Need Orders  [] Need Medications Sent to Pharmacy  []  Other     SUMMARY: Per protocol advised : OV today, patient working tod

## 2019-06-01 ENCOUNTER — OFFICE VISIT (OUTPATIENT)
Dept: FAMILY MEDICINE CLINIC | Facility: CLINIC | Age: 54
End: 2019-06-01
Payer: COMMERCIAL

## 2019-06-01 VITALS
SYSTOLIC BLOOD PRESSURE: 101 MMHG | HEIGHT: 61.5 IN | DIASTOLIC BLOOD PRESSURE: 61 MMHG | BODY MASS INDEX: 25.65 KG/M2 | HEART RATE: 72 BPM | WEIGHT: 137.63 LBS

## 2019-06-01 DIAGNOSIS — M54.9 UPPER BACK PAIN: Primary | ICD-10-CM

## 2019-06-01 PROCEDURE — 99213 OFFICE O/P EST LOW 20 MIN: CPT | Performed by: FAMILY MEDICINE

## 2019-06-01 PROCEDURE — 99212 OFFICE O/P EST SF 10 MIN: CPT | Performed by: FAMILY MEDICINE

## 2019-06-01 RX ORDER — METHYLPREDNISOLONE 4 MG/1
TABLET ORAL
Qty: 1 KIT | Refills: 0 | Status: SHIPPED | OUTPATIENT
Start: 2019-06-01 | End: 2019-06-12

## 2019-06-01 NOTE — PROGRESS NOTES
Tiffany Melgoza is a 48year old female. Patient presents with:  Back Pain    HPI:   Notes from nurse triage yesterday  \" Patient called today with c/o lower back pain for the past two months.  Pain got worse last night, pain 10/10 worked the night shift, palpation. - just medial to scapula in rhomboids   ASSESSMENT AND PLAN:   1. Upper back pain  Medrol dose pack. And referral to physiatrist.   - PHYSIATRY - INTERNAL        The patient indicates understanding of these issues and agrees to the plan.       Marisa Ferreira

## 2019-06-12 ENCOUNTER — TELEPHONE (OUTPATIENT)
Dept: NEUROLOGY | Facility: CLINIC | Age: 54
End: 2019-06-12

## 2019-06-12 ENCOUNTER — OFFICE VISIT (OUTPATIENT)
Dept: NEUROLOGY | Facility: CLINIC | Age: 54
End: 2019-06-12
Payer: COMMERCIAL

## 2019-06-12 VITALS
SYSTOLIC BLOOD PRESSURE: 90 MMHG | WEIGHT: 135 LBS | HEIGHT: 61 IN | DIASTOLIC BLOOD PRESSURE: 58 MMHG | HEART RATE: 76 BPM | BODY MASS INDEX: 25.49 KG/M2

## 2019-06-12 DIAGNOSIS — M54.12 RADICULITIS OF RIGHT CERVICAL REGION: Primary | ICD-10-CM

## 2019-06-12 PROCEDURE — 99204 OFFICE O/P NEW MOD 45 MIN: CPT | Performed by: PHYSICAL MEDICINE & REHABILITATION

## 2019-06-12 RX ORDER — TIZANIDINE HYDROCHLORIDE 2 MG/1
2 CAPSULE, GELATIN COATED ORAL 3 TIMES DAILY
Qty: 30 CAPSULE | Refills: 0 | Status: SHIPPED | OUTPATIENT
Start: 2019-06-12 | End: 2019-06-27

## 2019-06-12 NOTE — TELEPHONE ENCOUNTER
[facility] for authorization of approval for MRI SPINE CERVICAL CPT Code 29809. Spoke to  [address] [place] [address] who states  no prior authorization is required, attach medical notes along with the claims for review. Reference # [id number]. Will inform patient.  Pa

## 2019-06-12 NOTE — H&P
[place] [address]    History and Physical    Urban Case Patient Status:  No patient class for patient encounter    1965 MRN [id number]   Location [place] [address] Attending No att. providers found   Hosp Day # 0 Performed by Delroy Clifton MD at [facility] ENDOSCOPY   • D & C     • TUBAL LIGATION  1992     Family History   Problem Relation Age of Onset   • Uterine Cancer Mother      Social History:  Social History    Tobacco Use      Smoking status: Current Every Day S right sided neck pain. Neurological:   Strength testing: normal muscle bulk in the bilateral upper extremities. 5/5 strength in shoulder abduction, elbow flexion, elbow extension, wrist extension, finger flexion, ADM bilaterally.     Sensation: Decreased

## 2019-06-12 NOTE — PATIENT INSTRUCTIONS
We will call you once we have authorization to do the MRI of the cervical spine. If you do not hear from us within 3 business days call the office or send a message through [phone] and ask if the MRI has been approved.

## 2019-06-17 ENCOUNTER — HOSPITAL ENCOUNTER (OUTPATIENT)
Dept: MRI IMAGING | Age: 54
Discharge: HOME | End: 2019-06-17
Attending: PHYSICAL MEDICINE & REHABILITATION
Payer: COMMERCIAL

## 2019-06-17 DIAGNOSIS — M54.12 RADICULITIS OF RIGHT CERVICAL REGION: ICD-10-CM

## 2019-06-17 PROCEDURE — 72141 MRI NECK SPINE W/O DYE: CPT | Performed by: PHYSICAL MEDICINE & REHABILITATION

## 2019-06-19 ENCOUNTER — TELEPHONE (OUTPATIENT)
Dept: PAIN CLINIC | Facility: CLINIC | Age: 54
End: 2019-06-19

## 2019-06-19 ENCOUNTER — TELEPHONE (OUTPATIENT)
Dept: NEUROLOGY | Facility: CLINIC | Age: 54
End: 2019-06-19

## 2019-06-19 DIAGNOSIS — M99.01 SOMATIC DYSFUNCTION OF SPINE, CERVICAL: ICD-10-CM

## 2019-06-19 DIAGNOSIS — M79.18 MYOFASCIAL PAIN: Primary | ICD-10-CM

## 2019-06-19 NOTE — TELEPHONE ENCOUNTER
Notes recorded by Milagros Uriostegui DO on 6/19/2019 at 8:11 AM CDT  Please let the patient know that she has multiple disc bulges, all of which are minor. I recommend that we consider trigger point injections into the muscles or manipulation in the office.  Carlyn Manual

## 2019-06-19 NOTE — TELEPHONE ENCOUNTER
Informed patient of imaging results and recommendations per Dr. Aries Linares. Patient verbalized understanding and would like to make f/u appt. PSR - please schedule patient for f/u.

## 2019-06-21 ENCOUNTER — TELEPHONE (OUTPATIENT)
Dept: FAMILY MEDICINE CLINIC | Facility: CLINIC | Age: 54
End: 2019-06-21

## 2019-06-21 ENCOUNTER — TELEPHONE (OUTPATIENT)
Dept: PAIN CLINIC | Facility: CLINIC | Age: 54
End: 2019-06-21

## 2019-06-21 DIAGNOSIS — M79.18 MYOFASCIAL PAIN: Primary | ICD-10-CM

## 2019-06-21 DIAGNOSIS — M54.2 CERVICALGIA: ICD-10-CM

## 2019-06-21 RX ORDER — ACETAMINOPHEN AND CODEINE PHOSPHATE 300; 30 MG/1; MG/1
1 TABLET ORAL 2 TIMES DAILY PRN
Qty: 14 TABLET | Refills: 0 | Status: SHIPPED | OUTPATIENT
Start: 2019-06-21 | End: 2019-06-27

## 2019-06-21 NOTE — TELEPHONE ENCOUNTER
S/w Eleuterio at [address] [place] [address], called in Rx for  Tyenol w/David#3, start date 06/21/2019, #14,  Take 1 table by mouth BID daily. S/w w/pt let her know she can  Rx.

## 2019-06-21 NOTE — TELEPHONE ENCOUNTER
Patient called states had MRI but cannot see Rashi Shelton until June 27th,  Is requesting something for pain to get her thru this time as she does have to work     Allergies reviewed and pharmacy confirmed    Please advise and thank you     Routing to [facility] as

## 2019-06-21 NOTE — TELEPHONE ENCOUNTER
S/w Eleuterio at [address] [place] [address], called in Rx for  Tyenol w/Codein#3, start date 06/21/2019, #14,  Take 1 table by mouth BID daily.

## 2019-06-21 NOTE — TELEPHONE ENCOUNTER
Advised patient to call Dr. Armenta [place] office for pain medication advice. Patient stated he prescribed her muscle relaxant. She agreed to call Dr. Tamra Hahn. Advised to call us back if needed.

## 2019-06-21 NOTE — TELEPHONE ENCOUNTER
Paged at 1:48pm due to worsening nonradicular neck pain. No red flag signs; denies abnormal balance, fever, chills, weakness, b/b incontinence. Will prescribe short course of T3; patient to see me on Thursday and we can discuss further treatment options.

## 2019-06-27 ENCOUNTER — TELEPHONE (OUTPATIENT)
Dept: NEUROLOGY | Facility: CLINIC | Age: 54
End: 2019-06-27

## 2019-06-27 ENCOUNTER — OFFICE VISIT (OUTPATIENT)
Dept: NEUROLOGY | Facility: CLINIC | Age: 54
End: 2019-06-27
Payer: COMMERCIAL

## 2019-06-27 VITALS
DIASTOLIC BLOOD PRESSURE: 60 MMHG | BODY MASS INDEX: 25.49 KG/M2 | HEIGHT: 61 IN | OXYGEN SATURATION: 98 % | SYSTOLIC BLOOD PRESSURE: 90 MMHG | WEIGHT: 135 LBS | HEART RATE: 68 BPM

## 2019-06-27 DIAGNOSIS — M79.18 CERVICAL MYOFASCIAL PAIN SYNDROME: Primary | ICD-10-CM

## 2019-06-27 DIAGNOSIS — M54.2 CERVICALGIA: ICD-10-CM

## 2019-06-27 PROCEDURE — 99214 OFFICE O/P EST MOD 30 MIN: CPT | Performed by: PHYSICAL MEDICINE & REHABILITATION

## 2019-06-27 RX ORDER — ACETAMINOPHEN AND CODEINE PHOSPHATE 300; 30 MG/1; MG/1
TABLET ORAL
Qty: 14 TABLET | Refills: 0 | OUTPATIENT
Start: 2019-06-27

## 2019-06-27 RX ORDER — ACETAMINOPHEN AND CODEINE PHOSPHATE 300; 30 MG/1; MG/1
1 TABLET ORAL 2 TIMES DAILY PRN
Qty: 20 TABLET | Refills: 0 | Status: SHIPPED | OUTPATIENT
Start: 2019-06-27 | End: 2020-03-20

## 2019-06-27 RX ORDER — DOXEPIN HYDROCHLORIDE 10 MG/1
10 CAPSULE ORAL NIGHTLY
Qty: 30 CAPSULE | Refills: 0 | Status: SHIPPED | OUTPATIENT
Start: 2019-06-27 | End: 2019-07-23

## 2019-06-27 NOTE — PROGRESS NOTES
HPI:    Patient ID: Michelle Osborn is a 48year old female. 62-year-old female presents for follow-up of aching pain in the central and right-sided cervical thoracic region with numbness that radiates just past the deltoid insertion in the right arm. atraumatic. Eyes: Conjunctivae and EOM are normal.   Musculoskeletal:   Cervical range of motion: normal range of motion of the neck with right upper trapezial pain on cervical flexion and left cervical rotation.     Palpation: ttp in the upper trapezius and bilateral facets. 3. Multilevel disc degeneration C3-4 through C6-7 bulging discs and/or disc osteophyte complex at each of these levels with mild ventral thecal sac effacement.     4. Asymmetric right foraminal encroachment C3-4 left foraminal stenosi

## 2019-06-27 NOTE — PATIENT INSTRUCTIONS
Do not take the tylenol 3 within an hour of taking the doxepin. Stop the muscle relaxant. Come see me in 2 weeks for the trigger point injections if you have no improvement with the medication.

## 2019-06-27 NOTE — TELEPHONE ENCOUNTER
06/27/19 OV Instructions: Come see me in 2 weeks for the trigger point injections if you have no improvement with the medication.

## 2019-06-27 NOTE — TELEPHONE ENCOUNTER
[facility] Online for authorization of approval for Bilateral upper trapezius, rhomboid trigger point injections cpt code  . Per  NOTIFICATION/PRIOR AUTHORIZATION PROCEDURE CODE INQUIRY, no authorization is required.  Reference number was not provided

## 2019-07-23 DIAGNOSIS — M79.18 CERVICAL MYOFASCIAL PAIN SYNDROME: ICD-10-CM

## 2019-07-23 NOTE — TELEPHONE ENCOUNTER
Refill request for doxepin 10 mg, take 1 cap nightly, #30, no refills    LOV: 6/27/19  NOV: 8/1/19  Last refilled on 6/27/19

## 2019-07-25 RX ORDER — DOXEPIN HYDROCHLORIDE 10 MG/1
10 CAPSULE ORAL NIGHTLY
Qty: 30 CAPSULE | Refills: 0 | Status: SHIPPED | OUTPATIENT
Start: 2019-07-25 | End: 2019-08-27

## 2019-08-01 ENCOUNTER — MED REC SCAN ONLY (OUTPATIENT)
Dept: NEUROLOGY | Facility: CLINIC | Age: 54
End: 2019-08-01

## 2019-08-01 ENCOUNTER — OFFICE VISIT (OUTPATIENT)
Dept: NEUROLOGY | Facility: CLINIC | Age: 54
End: 2019-08-01
Payer: COMMERCIAL

## 2019-08-01 VITALS
HEART RATE: 75 BPM | BODY MASS INDEX: 25.49 KG/M2 | DIASTOLIC BLOOD PRESSURE: 60 MMHG | OXYGEN SATURATION: 99 % | HEIGHT: 61 IN | TEMPERATURE: 98 F | WEIGHT: 135 LBS | SYSTOLIC BLOOD PRESSURE: 100 MMHG

## 2019-08-01 DIAGNOSIS — M79.18 CERVICAL MYOFASCIAL PAIN SYNDROME: Primary | ICD-10-CM

## 2019-08-01 PROCEDURE — 99214 OFFICE O/P EST MOD 30 MIN: CPT | Performed by: PHYSICAL MEDICINE & REHABILITATION

## 2019-08-01 PROCEDURE — 20552 NJX 1/MLT TRIGGER POINT 1/2: CPT | Performed by: PHYSICAL MEDICINE & REHABILITATION

## 2019-08-01 RX ORDER — LIDOCAINE HYDROCHLORIDE 10 MG/ML
4.5 INJECTION, SOLUTION INFILTRATION; PERINEURAL ONCE
Status: COMPLETED | OUTPATIENT
Start: 2019-08-01 | End: 2019-08-01

## 2019-08-01 RX ORDER — TRIAMCINOLONE ACETONIDE 40 MG/ML
20 INJECTION, SUSPENSION INTRA-ARTICULAR; INTRAMUSCULAR ONCE
Status: COMPLETED | OUTPATIENT
Start: 2019-08-01 | End: 2019-08-01

## 2019-08-01 RX ORDER — ACETAMINOPHEN AND CODEINE PHOSPHATE 300; 30 MG/1; MG/1
1 TABLET ORAL 2 TIMES DAILY PRN
Qty: 30 TABLET | Refills: 0 | Status: SHIPPED | OUTPATIENT
Start: 2019-08-01 | End: 2019-08-27

## 2019-08-01 NOTE — PATIENT INSTRUCTIONS
Contact me by MyChart or telephone in 1 week and let me know how you are doing. May consider increasing the other medication depending on your status.

## 2019-08-27 DIAGNOSIS — M79.18 CERVICAL MYOFASCIAL PAIN SYNDROME: ICD-10-CM

## 2019-08-27 RX ORDER — DOXEPIN HYDROCHLORIDE 25 MG/1
25 CAPSULE ORAL NIGHTLY
Qty: 30 CAPSULE | Refills: 0 | Status: SHIPPED | OUTPATIENT
Start: 2019-08-27 | End: 2019-09-22

## 2019-08-27 RX ORDER — ACETAMINOPHEN AND CODEINE PHOSPHATE 300; 30 MG/1; MG/1
1 TABLET ORAL 2 TIMES DAILY PRN
Qty: 30 TABLET | Refills: 0 | Status: SHIPPED | OUTPATIENT
Start: 2019-08-27 | End: 2020-03-20

## 2019-08-27 NOTE — TELEPHONE ENCOUNTER
Called patient for status update per . Patient is Sami speaking.  Will route to [facility] for follow up of 's recommendations

## 2019-08-27 NOTE — TELEPHONE ENCOUNTER
Doxepin 10mg Take 1 capsule nightly #30 No refills    Last filled-7/25/2019    LOV-8/1/2019  NOV-none

## 2019-08-27 NOTE — TELEPHONE ENCOUNTER
S/w pt post injection 5% effective, back 5/10, localized. Pt claims at end of day pain 10/10. She is currently taking Doxepin 10mg daily with no side effects. Would like a refill of Doxepin.

## 2019-08-27 NOTE — TELEPHONE ENCOUNTER
S/w Jey Carly #[phone], refill Acetaminophen-Codeine#3. S/w pt let her know prescription is ready.

## 2019-08-27 NOTE — TELEPHONE ENCOUNTER
Please ask for status update; if no improvement with trigger points and no side effects from doxepin increase to 20mg qHS.

## 2019-08-27 NOTE — TELEPHONE ENCOUNTER
S/w pt to let her know the Doxepin has been increased to 25mg. She is now wanting a refill of Acetaminophen-Codeine#3, she takes this while she is working.

## 2019-09-22 DIAGNOSIS — M79.18 CERVICAL MYOFASCIAL PAIN SYNDROME: ICD-10-CM

## 2019-09-23 RX ORDER — DOXEPIN HYDROCHLORIDE 25 MG/1
25 CAPSULE ORAL NIGHTLY
Qty: 30 CAPSULE | Refills: 0 | Status: SHIPPED | OUTPATIENT
Start: 2019-09-23 | End: 2019-10-24

## 2019-09-23 NOTE — TELEPHONE ENCOUNTER
Refill request for doxepin 25 mg, take 1 cap nightly, #30, no refills    LOV: 8/1/19  NOV: none  Last refilled on 8/27/19

## 2019-10-24 ENCOUNTER — OFFICE VISIT (OUTPATIENT)
Dept: NEUROLOGY | Facility: CLINIC | Age: 54
End: 2019-10-24
Payer: COMMERCIAL

## 2019-10-24 VITALS
BODY MASS INDEX: 24.17 KG/M2 | HEIGHT: 61 IN | RESPIRATION RATE: 16 BRPM | WEIGHT: 128 LBS | HEART RATE: 84 BPM | SYSTOLIC BLOOD PRESSURE: 108 MMHG | DIASTOLIC BLOOD PRESSURE: 66 MMHG

## 2019-10-24 DIAGNOSIS — M79.18 CERVICAL MYOFASCIAL PAIN SYNDROME: ICD-10-CM

## 2019-10-24 PROCEDURE — 99214 OFFICE O/P EST MOD 30 MIN: CPT | Performed by: PHYSICAL MEDICINE & REHABILITATION

## 2019-10-24 RX ORDER — DOXEPIN HYDROCHLORIDE 50 MG/1
50 CAPSULE ORAL NIGHTLY
Qty: 30 CAPSULE | Refills: 0 | Status: SHIPPED | OUTPATIENT
Start: 2019-10-24 | End: 2019-12-02

## 2019-10-24 NOTE — PROGRESS NOTES
HPI:    Patient ID: Aurea Singer is a 48year old female. 48year old female presents for follow up. She continues to experience burning pain in the bilateral upper trapezius, one side not worse than the other, that worsens with overhead activity.  I 50 degrees with cervical flexion. 80 degrees with right cervical rotation. 80 degrees with left cervical rotation. Palpation: ttp in the upper trapezius bilaterally. Provocative maneuvers: Spurling test to the right + for neck pain only.  Spurling tj

## 2019-12-02 ENCOUNTER — TELEPHONE (OUTPATIENT)
Dept: NEUROLOGY | Facility: CLINIC | Age: 54
End: 2019-12-02

## 2019-12-02 DIAGNOSIS — M79.18 CERVICAL MYOFASCIAL PAIN SYNDROME: ICD-10-CM

## 2019-12-02 RX ORDER — DOXEPIN HYDROCHLORIDE 50 MG/1
50 CAPSULE ORAL NIGHTLY
Qty: 30 CAPSULE | Refills: 0 | Status: SHIPPED | OUTPATIENT
Start: 2019-12-02 | End: 2020-01-02

## 2019-12-05 ENCOUNTER — OFFICE VISIT (OUTPATIENT)
Dept: OBGYN CLINIC | Facility: CLINIC | Age: 54
End: 2019-12-05
Payer: COMMERCIAL

## 2019-12-05 VITALS
DIASTOLIC BLOOD PRESSURE: 73 MMHG | BODY MASS INDEX: 27 KG/M2 | WEIGHT: 141.19 LBS | SYSTOLIC BLOOD PRESSURE: 109 MMHG | HEART RATE: 75 BPM

## 2019-12-05 DIAGNOSIS — Z12.31 VISIT FOR SCREENING MAMMOGRAM: ICD-10-CM

## 2019-12-05 DIAGNOSIS — N91.4 SECONDARY OLIGOMENORRHEA: ICD-10-CM

## 2019-12-05 DIAGNOSIS — Z01.419 ENCOUNTER FOR GYNECOLOGICAL EXAMINATION WITHOUT ABNORMAL FINDING: Primary | ICD-10-CM

## 2019-12-05 PROCEDURE — 99396 PREV VISIT EST AGE 40-64: CPT | Performed by: OBSTETRICS & GYNECOLOGY

## 2019-12-05 PROCEDURE — 99212 OFFICE O/P EST SF 10 MIN: CPT | Performed by: OBSTETRICS & GYNECOLOGY

## 2019-12-05 NOTE — PROGRESS NOTES
Radha Elmore is a 48year old female [id number] Patient's last menstrual period was 12/23/2017 (approximate). who presents for Patient presents with:  Gyn Exam: Annual exam   Her cycles are not regular- she has skipped for 4 months.   She is also having hot Lifestyle      Physical activity:        Days per week: Not on file        Minutes per session: Not on file      Stress: Not on file    Relationships      Social connections:        Talks on phone: Not on file        Gets together: Not on file        Atten 0    ALLERGIES:  No Known Allergies      Review of Systems:  Constitutional:  Denies fatigue, night sweats, hot flashes  Eyes:  denies blurred or double vision  Cardiovascular:  denies chest pain or palpitations  Respiratory:  denies shortness of breath  G without tenderness  Adnexa: normal without masses or tenderness  Perineum: normal  Rectovaginal: no masses, normal tone  Anus: no hemorroids    Assessment & Plan:   ASCCP guidelines discussed,cotest done,mammogram ordered,rtc 1 year for annual exam  Plan a

## 2020-01-02 DIAGNOSIS — M79.18 CERVICAL MYOFASCIAL PAIN SYNDROME: ICD-10-CM

## 2020-01-02 RX ORDER — DOXEPIN HYDROCHLORIDE 50 MG/1
50 CAPSULE ORAL NIGHTLY
Qty: 30 CAPSULE | Refills: 0 | Status: SHIPPED | OUTPATIENT
Start: 2020-01-02 | End: 2020-02-17

## 2020-01-02 NOTE — TELEPHONE ENCOUNTER
Medication request: Doxepin 50mg 1 CAP HS    LOV: 10/24/19   NOV: none    [place] 12/02/19 #30 r-0

## 2020-01-08 ENCOUNTER — HOSPITAL ENCOUNTER (OUTPATIENT)
Dept: MAMMOGRAPHY | Age: 55
Discharge: HOME | End: 2020-01-08
Attending: OBSTETRICS & GYNECOLOGY
Payer: COMMERCIAL

## 2020-01-08 DIAGNOSIS — Z12.31 VISIT FOR SCREENING MAMMOGRAM: ICD-10-CM

## 2020-01-08 PROCEDURE — 77063 BREAST TOMOSYNTHESIS BI: CPT | Performed by: OBSTETRICS & GYNECOLOGY

## 2020-01-08 PROCEDURE — 77067 SCR MAMMO BI INCL CAD: CPT | Performed by: OBSTETRICS & GYNECOLOGY

## 2020-01-16 ENCOUNTER — HOSPITAL ENCOUNTER (OUTPATIENT)
Dept: ULTRASOUND IMAGING | Facility: HOSPITAL | Age: 55
Discharge: HOME | End: 2020-01-16
Attending: OBSTETRICS & GYNECOLOGY
Payer: COMMERCIAL

## 2020-01-16 ENCOUNTER — HOSPITAL ENCOUNTER (OUTPATIENT)
Dept: MAMMOGRAPHY | Facility: HOSPITAL | Age: 55
Discharge: HOME | End: 2020-01-16
Attending: OBSTETRICS & GYNECOLOGY
Payer: COMMERCIAL

## 2020-01-16 DIAGNOSIS — R92.8 ABNORMAL MAMMOGRAM: ICD-10-CM

## 2020-01-16 PROCEDURE — 76642 ULTRASOUND BREAST LIMITED: CPT | Performed by: OBSTETRICS & GYNECOLOGY

## 2020-01-16 PROCEDURE — 77061 BREAST TOMOSYNTHESIS UNI: CPT | Performed by: OBSTETRICS & GYNECOLOGY

## 2020-01-16 PROCEDURE — 77065 DX MAMMO INCL CAD UNI: CPT | Performed by: OBSTETRICS & GYNECOLOGY

## 2020-01-21 NOTE — PROGRESS NOTES
Benign appearing findings on mammogram, needs left diagnostic mammogram and left breast US in 6 months,please order and call pt

## 2020-01-28 ENCOUNTER — TELEPHONE (OUTPATIENT)
Dept: OBGYN CLINIC | Facility: CLINIC | Age: 55
End: 2020-01-28

## 2020-01-28 DIAGNOSIS — N60.02 BREAST CYST, LEFT: Primary | ICD-10-CM

## 2020-01-28 NOTE — TELEPHONE ENCOUNTER
Pt was advised of benign appearing findings on mammogram.  Pt will need a L Diagnostic Mammogram and L Breast US in 6 months. Order placed and pt will call to schedule. Pt agrees with plan.

## 2020-01-31 ENCOUNTER — TELEPHONE (OUTPATIENT)
Dept: OBGYN CLINIC | Facility: CLINIC | Age: 55
End: 2020-01-31

## 2020-01-31 NOTE — TELEPHONE ENCOUNTER
----- Message from Micaela Boateng MD sent at 1/20/2020  7:06 PM CST -----  Benign appearing findings on mammogram, needs left diagnostic mammogram and left breast US in 6 months,please order and call pt

## 2020-02-17 DIAGNOSIS — M79.18 CERVICAL MYOFASCIAL PAIN SYNDROME: ICD-10-CM

## 2020-02-17 RX ORDER — DOXEPIN HYDROCHLORIDE 50 MG/1
50 CAPSULE ORAL NIGHTLY
Qty: 30 CAPSULE | Refills: 0 | Status: SHIPPED | OUTPATIENT
Start: 2020-02-17 | End: 2020-03-20

## 2020-02-17 NOTE — TELEPHONE ENCOUNTER
Medication request: Doxepin 50mg 1 CAP HS    LOV: 10/24/19   NOV: none    [place] 01/02/20 #30 r-0

## 2020-03-19 ENCOUNTER — TELEPHONE (OUTPATIENT)
Dept: NEUROLOGY | Facility: CLINIC | Age: 55
End: 2020-03-19

## 2020-03-19 DIAGNOSIS — M79.18 CERVICAL MYOFASCIAL PAIN SYNDROME: ICD-10-CM

## 2020-03-19 NOTE — TELEPHONE ENCOUNTER
Medication request: Doxepin 50 mg Oral Cap. Take 1 capsule by mouth nightly. #30. No refills.     LOV: 10/24/2020   NOV: None    [place]: 2/17/2020

## 2020-03-20 RX ORDER — DOXEPIN HYDROCHLORIDE 50 MG/1
50 CAPSULE ORAL NIGHTLY
Qty: 30 CAPSULE | Refills: 0 | Status: SHIPPED | OUTPATIENT
Start: 2020-03-20 | End: 2020-04-21

## 2020-04-13 ENCOUNTER — HOSPITAL ENCOUNTER (OUTPATIENT)
Age: 55
Discharge: HOME | End: 2020-04-13
Attending: EMERGENCY MEDICINE
Payer: COMMERCIAL

## 2020-04-13 VITALS
DIASTOLIC BLOOD PRESSURE: 87 MMHG | OXYGEN SATURATION: 98 % | TEMPERATURE: 98 F | SYSTOLIC BLOOD PRESSURE: 132 MMHG | HEART RATE: 87 BPM | RESPIRATION RATE: 18 BRPM | WEIGHT: 130 LBS | BODY MASS INDEX: 25 KG/M2

## 2020-04-13 DIAGNOSIS — T07.XXXA MULTIPLE CONTUSIONS: Primary | ICD-10-CM

## 2020-04-13 PROCEDURE — 99212 OFFICE O/P EST SF 10 MIN: CPT
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
## 2020-04-21 DIAGNOSIS — M79.18 CERVICAL MYOFASCIAL PAIN SYNDROME: ICD-10-CM

## 2020-04-21 RX ORDER — DOXEPIN HYDROCHLORIDE 50 MG/1
50 CAPSULE ORAL NIGHTLY
Qty: 30 CAPSULE | Refills: 0 | Status: SHIPPED | OUTPATIENT
Start: 2020-04-21 | End: 2020-05-20

## 2020-04-21 NOTE — TELEPHONE ENCOUNTER
Medication request: Doxepin 50mg 1 CAP HS    LOV: 10/24/19   NOV: none    [place] 03/20/20 #30 r-0

## 2020-05-19 DIAGNOSIS — M79.18 CERVICAL MYOFASCIAL PAIN SYNDROME: ICD-10-CM

## 2020-05-20 RX ORDER — DOXEPIN HYDROCHLORIDE 50 MG/1
50 CAPSULE ORAL NIGHTLY
Qty: 30 CAPSULE | Refills: 0 | Status: SHIPPED | OUTPATIENT
Start: 2020-05-20 | End: 2020-06-22

## 2020-05-20 NOTE — TELEPHONE ENCOUNTER
Refill request for doxepin 50 mg, take 1 cap nightly, #30, no refills    LOV: 10/24/19  NOV: none  Last refilled on 4/21

## 2020-06-22 DIAGNOSIS — M79.18 CERVICAL MYOFASCIAL PAIN SYNDROME: ICD-10-CM

## 2020-06-22 RX ORDER — DOXEPIN HYDROCHLORIDE 50 MG/1
50 CAPSULE ORAL NIGHTLY
Qty: 30 CAPSULE | Refills: 0 | Status: SHIPPED | OUTPATIENT
Start: 2020-06-22 | End: 2020-07-23

## 2020-06-22 NOTE — TELEPHONE ENCOUNTER
Medication request: Doxepin, #30, no refills  Take 1 capsule by mouth nightly.      [place] 5/20/20    LOV: 10/24/19  NOV: Not yet scheduled

## 2020-07-23 DIAGNOSIS — M79.18 CERVICAL MYOFASCIAL PAIN SYNDROME: ICD-10-CM

## 2020-07-23 RX ORDER — DOXEPIN HYDROCHLORIDE 50 MG/1
50 CAPSULE ORAL NIGHTLY
Qty: 30 CAPSULE | Refills: 0 | Status: SHIPPED | OUTPATIENT
Start: 2020-07-23 | End: 2020-09-02

## 2020-07-23 NOTE — TELEPHONE ENCOUNTER
Refill request for doxepin 50 mg, take 1 cap nightly, #30, no refills    LOV: 10/24/19  NOV: none  Last refilled on 6/22

## 2020-08-19 ENCOUNTER — TELEPHONE (OUTPATIENT)
Dept: OBGYN CLINIC | Facility: CLINIC | Age: 55
End: 2020-08-19

## 2020-08-19 NOTE — TELEPHONE ENCOUNTER
Informed pt that she was due July 2020 for left diag mamm and left breast ultrasound in six months. Pt had in Jan 2020 mamm addl views left and left ultrasound and the recs were for a six month follow up. Pt given the phone number to schedule.  Pt states

## 2020-08-24 ENCOUNTER — TELEPHONE (OUTPATIENT)
Dept: NEUROLOGY | Facility: CLINIC | Age: 55
End: 2020-08-24

## 2020-09-10 ENCOUNTER — TELEPHONE (OUTPATIENT)
Dept: OBGYN CLINIC | Facility: CLINIC | Age: 55
End: 2020-09-10

## 2021-03-12 DIAGNOSIS — Z23 NEED FOR VACCINATION: ICD-10-CM

## 2021-04-15 NOTE — PROGRESS NOTES
HPI:    Patient ID: Bhavya Talbot is a 48year old female.     48year old female presents with persistent constant burning pain along the upper back and upper trapezius without radiation into the upper extremities nor is there associated numbness and tin reflexes bilaterally    Young test negative bilaterally   Skin: Skin is warm and dry. No rash noted. Nursing note and vitals reviewed.          ASSESSMENT/PLAN:   Cervical myofascial pain syndrome  (primary encounter diagnosis)    Trigger point injectio Additional Area 3 Units: 0 Show Additional Area 6: Yes Show Ucl Units: No Post-Care Instructions: Patient instructed to not lie down for 4 hours and limit physical activity for 24 hours. Glabellar Complex Units: 36 Consent: Written consent obtained. Risks include but not limited to lid/brow ptosis, bruising, swelling, diplopia, temporary effect, incomplete chemical denervation. Expiration Date (Month Year): 09/30/21 Detail Level: Detailed Periorbital Skin Units: 48 Lot #: [id number] Forehead Units: 21 Dilution (U/ 0.1cc): 12

## 2021-11-12 PROBLEM — F43.20 GRIEF REACTION: Status: ACTIVE | Noted: 2021-11-12

## 2021-11-12 PROBLEM — F33.1 MODERATE EPISODE OF RECURRENT MAJOR DEPRESSIVE DISORDER (HCC): Status: ACTIVE | Noted: 2021-11-12

## 2021-11-12 PROBLEM — F43.21 GRIEF REACTION: Status: ACTIVE | Noted: 2021-11-12

## 2022-08-11 NOTE — TELEPHONE ENCOUNTER
08/10/22 1800   Call Information   Date of Call 08/10/22   Time of Call 1820   Name of person requesting the team Christine CADENA   Title of person requesting team RN   RRT Arrival time 1825   Time RRT ended 1853   Reason for call   Type of RRT Adult   Primary reason for call Cardiovascular;Nurse is concerned/worried   Cardiovascular SBP less than 90   Was patient transferred from the ED, ICU, or PACU within last 24 hours prior to RRT call? No   SBAR   Situation Pt hypotensie after dialysis   Background PMHX COPD, HF, DM on HD, and dementia.   Notable History/Conditions Cardiac;Congestive heart failure;COPD;Diabetes;End-Stage disease;Organ failure   Assessment Pt alert, denies pain. SBP initially 50s increased to low 90s   Interventions Meds;Labs;ECG;Fluid bolus   Patient Outcome   Patient Outcome Stabilized on unit   RRT Team   Attending/Primary/Covering Physician Gold 9   Date Attending Physician notified 08/10/22   Time Attending Physician notified 1820   Physician(s) Lazaro CADENA   RT Bhargav M   Post RRT Intervention Assessment   Post RRT Assessment Stable/Improved   Date Follow Up Done 08/10/22   Time Follow Up Done 2200   Comments VSS, baseline mental status      for Psychiatric Non-Scheduled (Anti-Anxiety)  Refill Protocol Appointment Criteria  · Appointment scheduled in the past 6 months or in the next 3 months  Recent Outpatient Visits            3 months ago Anemia, unspecified type    [facility] [place], [facility], [place] REDDY
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
## 2023-05-05 ENCOUNTER — OFFICE VISIT (OUTPATIENT)
Dept: FAMILY MEDICINE CLINIC | Facility: CLINIC | Age: 58
End: 2023-05-05
Payer: MEDICAID

## 2023-05-05 VITALS
HEIGHT: 61.18 IN | WEIGHT: 121.19 LBS | DIASTOLIC BLOOD PRESSURE: 68 MMHG | RESPIRATION RATE: 18 BRPM | HEART RATE: 65 BPM | OXYGEN SATURATION: 98 % | BODY MASS INDEX: 22.88 KG/M2 | TEMPERATURE: 98 F | SYSTOLIC BLOOD PRESSURE: 104 MMHG

## 2023-05-05 DIAGNOSIS — F32.A ANXIETY AND DEPRESSION: ICD-10-CM

## 2023-05-05 DIAGNOSIS — Z13.21 SCREENING FOR ENDOCRINE, NUTRITIONAL, METABOLIC AND IMMUNITY DISORDER: ICD-10-CM

## 2023-05-05 DIAGNOSIS — Z00.00 ANNUAL PHYSICAL EXAM: Primary | ICD-10-CM

## 2023-05-05 DIAGNOSIS — Z13.0 SCREENING FOR ENDOCRINE, NUTRITIONAL, METABOLIC AND IMMUNITY DISORDER: ICD-10-CM

## 2023-05-05 DIAGNOSIS — Z12.31 ENCOUNTER FOR SCREENING MAMMOGRAM FOR MALIGNANT NEOPLASM OF BREAST: ICD-10-CM

## 2023-05-05 DIAGNOSIS — F41.9 ANXIETY AND DEPRESSION: ICD-10-CM

## 2023-05-05 DIAGNOSIS — Z13.29 SCREENING FOR ENDOCRINE, NUTRITIONAL, METABOLIC AND IMMUNITY DISORDER: ICD-10-CM

## 2023-05-05 DIAGNOSIS — Z13.6 SCREENING FOR ISCHEMIC HEART DISEASE: ICD-10-CM

## 2023-05-05 DIAGNOSIS — Z13.228 SCREENING FOR ENDOCRINE, NUTRITIONAL, METABOLIC AND IMMUNITY DISORDER: ICD-10-CM

## 2023-05-05 PROBLEM — F33.1 MODERATE EPISODE OF RECURRENT MAJOR DEPRESSIVE DISORDER (HCC): Status: RESOLVED | Noted: 2021-11-12 | Resolved: 2023-05-05

## 2023-05-05 PROCEDURE — 3008F BODY MASS INDEX DOCD: CPT | Performed by: FAMILY MEDICINE

## 2023-05-05 PROCEDURE — 99386 PREV VISIT NEW AGE 40-64: CPT | Performed by: FAMILY MEDICINE

## 2023-05-05 PROCEDURE — 3074F SYST BP LT 130 MM HG: CPT | Performed by: FAMILY MEDICINE

## 2023-05-05 PROCEDURE — 3078F DIAST BP <80 MM HG: CPT | Performed by: FAMILY MEDICINE

## 2023-05-05 RX ORDER — CITALOPRAM 20 MG/1
20 TABLET ORAL NIGHTLY
Qty: 90 TABLET | Refills: 1 | Status: SHIPPED | OUTPATIENT
Start: 2023-05-05

## 2023-05-05 RX ORDER — CLONAZEPAM 2 MG/1
2 TABLET ORAL 2 TIMES DAILY PRN
Qty: 90 TABLET | Refills: 1 | Status: SHIPPED | OUTPATIENT
Start: 2023-05-05

## 2023-05-05 RX ORDER — CLONAZEPAM 2 MG/1
2 TABLET ORAL 2 TIMES DAILY
COMMUNITY
Start: 2022-12-08 | End: 2023-05-05

## 2023-05-09 LAB
HPV MRNA E6/E7: NEGATIVE
THINPREP PAP: NORMAL

## 2023-06-08 ENCOUNTER — HOSPITAL ENCOUNTER (OUTPATIENT)
Dept: MAMMOGRAPHY | Facility: HOSPITAL | Age: 58
Discharge: HOME | End: 2023-06-08
Attending: FAMILY MEDICINE
Payer: MEDICAID

## 2023-06-08 DIAGNOSIS — Z12.31 ENCOUNTER FOR SCREENING MAMMOGRAM FOR MALIGNANT NEOPLASM OF BREAST: ICD-10-CM

## 2023-06-08 PROCEDURE — 77063 BREAST TOMOSYNTHESIS BI: CPT | Performed by: FAMILY MEDICINE

## 2023-06-08 PROCEDURE — 77067 SCR MAMMO BI INCL CAD: CPT | Performed by: FAMILY MEDICINE

## 2023-06-10 DIAGNOSIS — R92.8 ABNORMAL MAMMOGRAM: Primary | ICD-10-CM

## 2023-06-22 ENCOUNTER — HOSPITAL ENCOUNTER (OUTPATIENT)
Dept: ULTRASOUND IMAGING | Facility: HOSPITAL | Age: 58
Discharge: HOME | End: 2023-06-22
Attending: FAMILY MEDICINE
Payer: MEDICAID

## 2023-06-22 DIAGNOSIS — R92.8 ABNORMAL MAMMOGRAM: ICD-10-CM

## 2023-06-22 PROCEDURE — 76642 ULTRASOUND BREAST LIMITED: CPT | Performed by: FAMILY MEDICINE

## 2023-06-30 ENCOUNTER — TELEPHONE (OUTPATIENT)
Dept: FAMILY MEDICINE CLINIC | Facility: CLINIC | Age: 58
End: 2023-06-30

## 2023-09-29 DIAGNOSIS — F41.9 ANXIETY AND DEPRESSION: ICD-10-CM

## 2023-09-29 DIAGNOSIS — F32.A ANXIETY AND DEPRESSION: ICD-10-CM

## 2023-09-29 NOTE — TELEPHONE ENCOUNTER
Refill no protocol available:     Pt requesting refill of   Requested Prescriptions     Pending Prescriptions Disp Refills    clonazePAM 2 MG Oral Tab 90 tablet 1     Sig: Take 1 tablet (2 mg total) by mouth 2 (two) times daily as needed for Anxiety. Sent to Provider for review:  Anxiety and depression  - started treatment 2 yrs ago  - currently stable on current meds: Citalopram and Clonazepam nightly  - IPMP reviewed  - medication Q3 months (since is on Clonazepam)     - citalopram 20 MG Oral Tab; Take 1 tablet (20 mg total) by mouth nightly. Dispense: 90 tablet; Refill: 1  - clonazePAM 2 MG Oral Tab; Take 1 tablet (2 mg total) by mouth 2 (two) times daily as needed for Anxiety. Dispense: 90 tablet; Refill: 1    Last Time Medication was Filled:  5/5/2023    Last Office Visit with Provider: 5/5/2023    No future appointments. Return in about 3 months (around 8/5/2023) for medication follow-up, anxiety.

## 2023-09-29 NOTE — TELEPHONE ENCOUNTER
Patient called requesting refill on Clonazepam. Patient was not able to make it to the follow up appointment due to having an emergency and having to fly to [address] [place] [address] Street is now experiencing night sweats and insomnia.  I scheduled patient for a follow up appointment on 10/18/2023

## 2023-09-30 RX ORDER — CLONAZEPAM 2 MG/1
2 TABLET ORAL 2 TIMES DAILY PRN
Qty: 90 TABLET | Refills: 1 | OUTPATIENT
Start: 2023-09-30

## 2023-09-30 NOTE — TELEPHONE ENCOUNTER
Please inform pt;    Pt last seen on 8/5/23, needs to be seen Q3 months for Clonazepam (controlled substance) follow-ups. Will fill when she comes in for her next scheduled appt in 10/2023. Thanks.

## 2023-10-25 ENCOUNTER — TELEPHONE (OUTPATIENT)
Dept: FAMILY MEDICINE CLINIC | Facility: CLINIC | Age: 58
End: 2023-10-25

## 2023-10-25 DIAGNOSIS — F41.9 ANXIETY AND DEPRESSION: ICD-10-CM

## 2023-10-25 DIAGNOSIS — F32.A ANXIETY AND DEPRESSION: ICD-10-CM

## 2023-10-25 RX ORDER — CLONAZEPAM 2 MG/1
2 TABLET ORAL 2 TIMES DAILY PRN
Qty: 30 TABLET | Refills: 0 | Status: SHIPPED | OUTPATIENT
Start: 2023-10-25

## 2023-10-25 NOTE — TELEPHONE ENCOUNTER
Pt called, states she did not like sertraline. Reports her hands went numb and she could not wash dishes. Stopped taking it after 4 days. States she started taking clonazepam again. States she is going on vacation on to [place] for 2 week on Saturday and needs refills. She states once she comes back from vacation, she will set up appt with PCP and figure out what other medication she can take. Rx pended for provider review and signature.

## 2023-10-25 NOTE — TELEPHONE ENCOUNTER
I really want her to stop the Clonazepam, which is why we prescribed the Sertraline daily, but also to also use the Hydroxyzine prn. Is she ok just using the Hydroxyzine for now? The Clonazepam is addictive and habit forming. Thanks.

## 2023-10-27 ENCOUNTER — TELEPHONE (OUTPATIENT)
Dept: FAMILY MEDICINE CLINIC | Facility: CLINIC | Age: 58
End: 2023-10-27

## 2023-10-27 NOTE — TELEPHONE ENCOUNTER
Pt calling for clonazepam refills - pt leaves on vacation tomorrow and comes back in 2 weeks. Pt takes clonazepam BID everyday. She recently had OV with Dr. Paris Mcghee and was prescribed clonazepam (Sig:  Take 1 tablet (2 mg total) by mouth 2 (two) times daily as needed for Anxiety). Pt does not take sertraline since she does not like how it makes her feel - she felt numbness in the hands, \"could not even wash dishes\"    Pt advised that she needs to discuss medications with Dr. Paris Mcghee. Pt scheduled for video visit today. Routed to provider for review of above.

## 2024-01-29 ENCOUNTER — OFFICE VISIT (OUTPATIENT)
Dept: FAMILY MEDICINE CLINIC | Facility: CLINIC | Age: 59
End: 2024-01-29
Payer: MEDICAID

## 2024-01-29 ENCOUNTER — TELEPHONE (OUTPATIENT)
Dept: FAMILY MEDICINE CLINIC | Facility: CLINIC | Age: 59
End: 2024-01-29

## 2024-01-29 VITALS
RESPIRATION RATE: 20 BRPM | BODY MASS INDEX: 23.53 KG/M2 | OXYGEN SATURATION: 99 % | WEIGHT: 124.63 LBS | DIASTOLIC BLOOD PRESSURE: 60 MMHG | SYSTOLIC BLOOD PRESSURE: 100 MMHG | HEIGHT: 61.18 IN | TEMPERATURE: 98 F | HEART RATE: 78 BPM

## 2024-01-29 DIAGNOSIS — T78.40XA ALLERGIC REACTION, INITIAL ENCOUNTER: Primary | ICD-10-CM

## 2024-01-29 PROCEDURE — 99213 OFFICE O/P EST LOW 20 MIN: CPT | Performed by: FAMILY MEDICINE

## 2024-01-29 RX ORDER — PREDNISONE 20 MG/1
40 TABLET ORAL DAILY
Qty: 10 TABLET | Refills: 0 | Status: SHIPPED | OUTPATIENT
Start: 2024-01-29 | End: 2024-02-03

## 2024-01-29 NOTE — TELEPHONE ENCOUNTER
Pt had Thia food on Friday, her eyes started to itch and get swollen along with her lips    Pt took Benadryl, Claritin, and Allegra with very little relief. Reports Benadryl helped a little with the swelling, but not much.    Pt denies SOB, trouble swallowing, or any other s/s.    Since no appt with PCP, offered appt with Dr. Newton. Pt accepted.    Routed to provider for review.

## 2024-01-29 NOTE — PATIENT INSTRUCTIONS
Prednisone x 5 days     Cont on benadryl       F/u with Dr. Johnston sooner than April to discuss sleep issues

## 2024-01-29 NOTE — PROGRESS NOTES
HPI:     Lora Rowland is a 58 year old female presents for    Possible allergic reaction.  Normally sees Dr. Johnston.  Symptoms started 2 to 3 days ago.  Was at a friend's house having Ethiopian food.  Has previously had Ethiopian food.  That night started to notice her skin became itchy.  Her eyes were watery.  Felt like she was swollen under both eyes.  The next day her lips were swollen and neck was swollen.  Her daughter is a nurse and told her to take 2 Allegra but it did not work.  Never had any difficulty breathing or talking.  Feels like her face is very dry.  Has put Vaseline on it which does help with the dryness.  Overall the swelling is down.  Denies any shortness of breath or difficulties breathing.  Is still taking Benadryl as needed.  This helps some.  Has never had this before.  Has never been tested for allergies before.  Denies any new products including any new make-ups or topical treatments.  No new medicines.  No new foods.      Medications (Active prior to today's visit):  Current Outpatient Medications   Medication Sig Dispense Refill    hydrOXYzine 50 MG Oral Tab Take 1-2 tab PO Q6 prn anxiety or sleep 60 tablet 1    clonazePAM 2 MG Oral Tab Take 1 tablet (2 mg total) by mouth daily as needed for Anxiety. 30 tablet 1    zolpidem 5 MG Oral Tab Take 1 tablet (5 mg total) by mouth nightly as needed for Sleep. 30 tablet 0       Allergies:  No Known Allergies    PSFH elements reviewed from today and agreed except as otherwise stated in HPI.  ROS:      Pertinent positives and negatives noted in the the HPI.    PHYSICAL EXAM:     Vitals:    01/29/24 1545   BP: 100/60   BP Location: Right arm   Patient Position: Sitting   Cuff Size: adult   Pulse: 78   Resp: 20   Temp: 98.4 °F (36.9 °C)   SpO2: 99%   Weight: 124 lb 9.6 oz (56.5 kg)   Height: 5' 1.18\" (1.554 m)     Vital signs reviewed.Appears stated age, well groomed.  Physical Exam  Constitutional:       Appearance: Normal appearance. She is  well-developed.   HENT:      Mouth/Throat:      Mouth: Mucous membranes are moist.      Pharynx: Oropharynx is clear.      Comments: Lips slightly swollen  Eyes:      Pupils: Pupils are equal, round, and reactive to light.     Cardiovascular:      Rate and Rhythm: Normal rate and regular rhythm.      Pulses: Normal pulses.      Heart sounds: No murmur heard.     No friction rub. No gallop.   Pulmonary:      Effort: Pulmonary effort is normal. No respiratory distress.      Breath sounds: No wheezing, rhonchi or rales.   Abdominal:      General: Bowel sounds are normal. There is no distension.      Palpations: Abdomen is soft.      Tenderness: There is no abdominal tenderness.   Musculoskeletal:         General: No tenderness.      Cervical back: Neck supple.      Right lower leg: No edema.      Left lower leg: No edema.   Lymphadenopathy:      Cervical: No cervical adenopathy.   Skin:     General: Skin is warm.   Neurological:      General: No focal deficit present.      Mental Status: She is alert.   Psychiatric:         Mood and Affect: Mood is anxious.         Speech: Speech normal.         Behavior: Behavior normal. Behavior is cooperative.          ASSESSMENT/PLAN:   58 year old female with    1. Allergic reaction, initial encounter      Suspect allergic reaction.  Will treat with prednisone burst for 5 days  Can take Benadryl as needed for itching  If symptoms worsen acutely or develops any shortness of breath or difficulties breathing, recommend evaluation in nearest ER  Follow-up with PCP as previously recommended by PCP, had other chronic issues such as sleep she wanted to discuss.  Can discuss possible allergy testing with her next time she sees her    Patient/Caregiver Education: There are no barriers to learning. Medical education done.   Outcome: Patient verbalizes understanding and agrees with plan. Advised to call or RTC if symptoms persist or worsen.    Awa Newton DO, 02/03/24, 9:29 AM    Patient  understands plan and follow-up.

## 2024-01-29 NOTE — TELEPHONE ENCOUNTER
Patient was seen today in office and before leaving her appointment she asked if she can have a prescription for an ointment due to her allergic reaction ?

## 2024-01-30 NOTE — TELEPHONE ENCOUNTER
Spoke to pt and informed of provider indications. Pt voiced understanding, denies additional questions at this time

## 2024-02-13 ENCOUNTER — TELEPHONE (OUTPATIENT)
Dept: FAMILY MEDICINE CLINIC | Facility: CLINIC | Age: 59
End: 2024-02-13

## 2024-02-13 NOTE — TELEPHONE ENCOUNTER
Pt called, states she saw PCP for insomnia and was prescribed Zolpidem because it has worked for her in the past.    However, pt realized that the Zolpidem is both too strong a dose and the wrong medication. Reports the current medication is too strong and thus she stopped taking it.     Pt reports the medication that had worked for her in the past was Lunesta 3mg. Wants to know if PCP can switch medication.    Informed pt she would need appt with PCP to discuss changes in medication.    Appt set for 2/15/2024.    Routed to provider for review.

## 2024-02-16 ENCOUNTER — TELEPHONE (OUTPATIENT)
Dept: FAMILY MEDICINE CLINIC | Facility: CLINIC | Age: 59
End: 2024-02-16

## 2024-02-16 DIAGNOSIS — G47.00 INSOMNIA, UNSPECIFIED TYPE: Primary | ICD-10-CM

## 2024-02-16 NOTE — TELEPHONE ENCOUNTER
Pt called, states sh went to  sleep medication, but the ones prescribed are not covered by her insurance. Wants to know if PCP can send an alternative. States it does not matter id brand name or generic, as long as her insurance covers it.    Routed to provider for review and advice.

## 2024-02-16 NOTE — TELEPHONE ENCOUNTER
There is no other alternative for Lunesta/Eszopiclone. She already states Zolpidem does not work for her.       She can be on a daily medication called Trazodone to help with sleep. This is an antidepressant that we use to insomnia. If further questions, she needs an appt to discuss. Thanks.

## 2024-02-19 RX ORDER — TRAZODONE HYDROCHLORIDE 50 MG/1
TABLET ORAL
Qty: 30 TABLET | Refills: 1 | Status: SHIPPED | OUTPATIENT
Start: 2024-02-19

## 2024-02-19 NOTE — TELEPHONE ENCOUNTER
Pt informed of provider indications regarding medications for insomnia.     Pt amenable to try Trazodone, states she has not been able to sleep well in weeks.     Rx pended for trazodone, please review signature

## 2024-02-27 ENCOUNTER — TELEPHONE (OUTPATIENT)
Dept: FAMILY MEDICINE CLINIC | Facility: CLINIC | Age: 59
End: 2024-02-27

## 2024-02-27 NOTE — TELEPHONE ENCOUNTER
Incoming call from pt, states she has been trying the Trazodone prescription that she was given for Insomnia 02/19/24 and she has not been able to sleep. States she has been waking once every hour through the night. She also notes feeling anxious after taking medication.     Pt would like to know provider recommendations or if she should schedule follow up apt for insomnia.     Last OV 02/15/24 sent to provider for review and advice

## 2024-03-20 ENCOUNTER — OFFICE VISIT (OUTPATIENT)
Dept: FAMILY MEDICINE CLINIC | Facility: CLINIC | Age: 59
End: 2024-03-20
Payer: MEDICAID

## 2024-03-20 VITALS
DIASTOLIC BLOOD PRESSURE: 62 MMHG | HEART RATE: 62 BPM | BODY MASS INDEX: 23 KG/M2 | RESPIRATION RATE: 16 BRPM | OXYGEN SATURATION: 99 % | TEMPERATURE: 97 F | SYSTOLIC BLOOD PRESSURE: 100 MMHG | WEIGHT: 123.38 LBS

## 2024-03-20 DIAGNOSIS — G47.00 INSOMNIA, UNSPECIFIED TYPE: Primary | ICD-10-CM

## 2024-03-20 DIAGNOSIS — Z12.31 ENCOUNTER FOR SCREENING MAMMOGRAM FOR MALIGNANT NEOPLASM OF BREAST: ICD-10-CM

## 2024-03-20 PROCEDURE — 99214 OFFICE O/P EST MOD 30 MIN: CPT | Performed by: FAMILY MEDICINE

## 2024-03-20 RX ORDER — ZOLPIDEM TARTRATE 5 MG/1
5 TABLET ORAL NIGHTLY PRN
Qty: 30 TABLET | Refills: 2 | Status: SHIPPED | OUTPATIENT
Start: 2024-03-20

## 2024-03-20 RX ORDER — ZOLPIDEM TARTRATE 5 MG/1
5 TABLET ORAL NIGHTLY
COMMUNITY
End: 2024-03-20

## 2024-03-26 NOTE — PROGRESS NOTES
CHIEF COMPLAINT: No chief complaint on file.        HPI:     Lora Rowland is a 58 year old female presents for sleep medication change.       Anxiety and depression f-u: Pt has been struggling with sleep and insomnia.  Was Rx'd Lunesta at previous visit, but this is not covered by insurance.  She was also tried on Trazodone, but states this is not working for her.  She states that the Zolpidem worked well for her before.  She avoids caffeine late in the day. She exercises regularly with yoga.  She has a regular sleep schedule, asleep by 9-10pm and awake by 7a.     Previous HPI from 2/2024: Pt continues to have difficulty with falling asleep.  She was tried on Zolpidem 5 mg at previous visit, but states this is not working. She thought this was what her friend gave her, but turns out her friend told her it is Lunesta 3 mg.  She uses the Conzepam 1 mg to help manage her anxiety, but uses this only every few days and does not take this with the sleeping medication.     Previous HPI: At previous visit was tried on Paroxetine, but pt states she did not notice any improvement. She takes the Hydroxyzine in the daytime, finds this helpful, She states this is not helping her sleep (50 mg Hydroxyzine). She takes the Clonazepam every other day.  She has difficulty with falling asleep, wakes often through the night.  If she has no medication, she gets only 2-3 hrs of sleep.  She exercises regularly, yoga, sleeps conssitently at 9p and wakes at 7a.  She continues to travel with a group of friends touring different countries. During one of the trips a friend let her try Zolpidem and pt states she had the best slepe after taking that medication.     Previous HPI from 10/2023: Pt states she tried the Sertraline as Rx'd last week, but pt states this made her fingers tingle so she stopped this and went back to taking Clonazepam.  She reports this medication works so much better.  She states also that Hydroxyzine has been a little  helpful.  She is leaving for [place] next week and would like something for her upcoming trip.     Previous HPI: Pt had done 1 month without medication- she has been traveling a lot to [place], then to [place].  She notes feeling very anxious on the Citalopram. She also ha snot taken the Clonazepam, wants to stop this medication. She is open to trying a new medication.  She has gained about 3 lbs.  Appetite is good.  Sleep has been poor, due to jet lag.  She will be traveling to [place] next week. She has no SI and no plan.     Previous HPI from 2023:  She was first dx'd shortly after she became a  2 yrs ago.  Her  passed away unexpectedly in a tragic accident 2 yrs ago.  Then she lost her home.  She had a decreased appetite, lost 10 lbs. Was having difficulty sleeping.  She had been seeing Psycholgist, Dr. Sneed, as well as in therapy.  She has been  On Citalopram and Clonazepam nightly, tolerating well.  She has no SI currently and no plan.                    HISTORY:  Past Medical History:   Diagnosis Date    Anemia     Myoma       Past Surgical History:   Procedure Laterality Date          COLONOSCOPY      COLONOSCOPY N/A 2018    Procedure: COLONOSCOPY;  Surgeon: BECCA Bhat MD;  Location: [facility] ENDOSCOPY    D & C      TUBAL LIGATION        Family History   Problem Relation Age of Onset    Hypertension Mother     Uterine Cancer Mother     Hypertension Father     Thyroid Disorder Sister     Breast Cancer Neg     Colon Cancer Neg       Social History:   Social History     Socioeconomic History    Marital status:    Tobacco Use    Smoking status: Every Day     Packs/day: 0.33     Years: 20.00     Additional pack years: 0.00     Total pack years: 6.60     Types: Cigarettes     Start date:     Smokeless tobacco: Never    Tobacco comments:     3 cigs per day    Vaping Use    Vaping Use: Never used   Substance and Sexual Activity    Alcohol use: Not Currently      Alcohol/week: 0.0 standard drinks of alcohol     Comment: socially    Drug use: Yes     Types: Cannabis     Comment: CBD oil fat night     Sexual activity: Yes     Partners: Male     Birth control/protection: Tubal Ligation   Other Topics Concern    Caffeine Concern Yes     Comment: 1 cup coffee daily   Social History Narrative    The patient does not use an assistive device..      The patient does live in a home with stairs.        Medications (Active prior to today's visit):  Current Outpatient Medications   Medication Sig Dispense Refill    zolpidem 5 MG Oral Tab Take 1 tablet (5 mg total) by mouth nightly as needed for Sleep. 30 tablet 2       Allergies:  No Known Allergies    PSFH elements reviewed from today and agreed except as otherwise stated in HPI.  ROS:     Review of Systems   Constitutional:  Negative for appetite change.   Gastrointestinal:  Negative for abdominal pain, diarrhea, nausea and vomiting.   Genitourinary:  Negative for frequency.   Psychiatric/Behavioral:  Positive for sleep disturbance. Negative for decreased concentration and dysphoric mood. The patient is nervous/anxious.          Pertinent positives and negatives noted in the the HPI.    PHYSICAL EXAM:   /62 (BP Location: Left arm, Patient Position: Sitting, Cuff Size: adult)   Pulse 62   Temp 97.3 °F (36.3 °C) (Temporal)   Resp 16   Wt 123 lb 6.4 oz (56 kg)   SpO2 99%   BMI 23.18 kg/m²   Vital signs reviewed.Appears stated age, well groomed.  Physical Exam     LABS     No visits with results within 2 Month(s) from this visit.   Latest known visit with results is:   Abstract on 05/09/2023   Component Date Value    THINPREP PAP 12/05/2019 normal     HPV MRNA E6/E7 12/05/2019 negative       REVIEWED THIS VISIT  ASSESSMENT/PLAN:   58 year old female with    1. Insomnia, unspecified type  - pt intolerant to Citalopram and tried on Sertraline and Paroxetine in the past  - wants to stop taking Clonzepam (she continues to take  Clonazepam 1 mg every other day for now)  - Hydroxyzine not helping with sleep   - was tried on Lunesta- not covered by insurance  - start Zolpidem 5 mg at bedtime prn  -  DO NOT take with Clonzepam or drink EtOH on these medications)  - medication f-u in 4-6 wks or sooner prn     - zolpidem 5 MG Oral Tab; Take 1 tablet (5 mg total) by mouth nightly as needed for Sleep.  Dispense: 30 tablet; Refill: 2    2. Encounter for screening mammogram for malignant neoplasm of breast    - [facility] TIMOTHY 2D+3D SCREENING BILAT (CPT=77067/76398); Future      Meds This Visit:  Requested Prescriptions     Signed Prescriptions Disp Refills    zolpidem 5 MG Oral Tab 30 tablet 2     Sig: Take 1 tablet (5 mg total) by mouth nightly as needed for Sleep.       Health Maintenance:  Health Maintenance   Topic Date Due    Zoster Vaccines (1 of 2) Never done    COVID-19 Vaccine (3 - 2023-24 season) 09/01/2023    Annual Physical  05/05/2024    Mammogram  06/08/2024    Pneumococcal Vaccine: Birth to 64yrs (2 of 2 - PCV) 10/18/2024 (Originally 9/2/2021)    Tobacco Cessation Counseling 1 Year  10/18/2024    Pap Smear  12/05/2024    DTaP,Tdap,and Td Vaccines (2 - Td or Tdap) 01/23/2028    Colorectal Cancer Screening  02/20/2028    Influenza Vaccine  Completed    Annual Depression Screening  Completed         Patient/Caregiver Education: There are no barriers to learning. Medical education done.   Outcome: Patient verbalizes understanding and agrees with plan. Advised to call or RTC if symptoms persist or worsen.    Problem List:     Patient Active Problem List   Diagnosis    Hot flash, menopausal    Leiomyoma of uterus    Anxiety and depression    Insomnia    Female infertility    Anemia    Grief reaction       Imaging & Referrals:  [facility] TIMOT[facility] 2D+3D SCREENING BILAT (CPT=77067/69641)     3/26/2024  Sena Johnston MD      Patient understands plan and follow-up.  Return in about 3 months (around 6/20/2024) for annual physical, insomnia f-u .

## 2024-06-12 ENCOUNTER — OFFICE VISIT (OUTPATIENT)
Dept: FAMILY MEDICINE CLINIC | Facility: CLINIC | Age: 59
End: 2024-06-12
Payer: MEDICAID

## 2024-06-12 VITALS
BODY MASS INDEX: 23.56 KG/M2 | DIASTOLIC BLOOD PRESSURE: 70 MMHG | TEMPERATURE: 98 F | OXYGEN SATURATION: 98 % | SYSTOLIC BLOOD PRESSURE: 104 MMHG | WEIGHT: 124.81 LBS | HEIGHT: 61 IN | HEART RATE: 75 BPM | RESPIRATION RATE: 16 BRPM

## 2024-06-12 DIAGNOSIS — Z87.891 HISTORY OF TOBACCO USE: ICD-10-CM

## 2024-06-12 DIAGNOSIS — F32.A ANXIETY AND DEPRESSION: ICD-10-CM

## 2024-06-12 DIAGNOSIS — Z13.21 SCREENING FOR ENDOCRINE, NUTRITIONAL, METABOLIC AND IMMUNITY DISORDER: ICD-10-CM

## 2024-06-12 DIAGNOSIS — Z13.228 SCREENING FOR ENDOCRINE, NUTRITIONAL, METABOLIC AND IMMUNITY DISORDER: ICD-10-CM

## 2024-06-12 DIAGNOSIS — Z13.6 SCREENING FOR HEART DISEASE: ICD-10-CM

## 2024-06-12 DIAGNOSIS — Z13.0 SCREENING FOR ENDOCRINE, NUTRITIONAL, METABOLIC AND IMMUNITY DISORDER: ICD-10-CM

## 2024-06-12 DIAGNOSIS — Z13.0 SCREENING FOR IRON DEFICIENCY ANEMIA: ICD-10-CM

## 2024-06-12 DIAGNOSIS — G47.00 INSOMNIA, UNSPECIFIED TYPE: ICD-10-CM

## 2024-06-12 DIAGNOSIS — Z23 NEED FOR VACCINATION: ICD-10-CM

## 2024-06-12 DIAGNOSIS — F41.9 ANXIETY AND DEPRESSION: ICD-10-CM

## 2024-06-12 DIAGNOSIS — Z13.29 SCREENING FOR ENDOCRINE, NUTRITIONAL, METABOLIC AND IMMUNITY DISORDER: ICD-10-CM

## 2024-06-12 DIAGNOSIS — Z00.00 ANNUAL PHYSICAL EXAM: Primary | ICD-10-CM

## 2024-06-12 PROCEDURE — 90471 IMMUNIZATION ADMIN: CPT | Performed by: FAMILY MEDICINE

## 2024-06-12 PROCEDURE — 99396 PREV VISIT EST AGE 40-64: CPT | Performed by: FAMILY MEDICINE

## 2024-06-12 PROCEDURE — 90750 HZV VACC RECOMBINANT IM: CPT | Performed by: FAMILY MEDICINE

## 2024-06-12 RX ORDER — CLONAZEPAM 2 MG/1
TABLET ORAL
COMMUNITY
Start: 2024-06-05 | End: 2024-06-12 | Stop reason: ALTCHOICE

## 2024-06-12 RX ORDER — CLONAZEPAM 1 MG/1
1 TABLET ORAL DAILY PRN
Qty: 30 TABLET | Refills: 2 | Status: SHIPPED | OUTPATIENT
Start: 2024-06-12

## 2024-06-12 RX ORDER — ZOLPIDEM TARTRATE 5 MG/1
5 TABLET ORAL NIGHTLY PRN
Qty: 30 TABLET | Refills: 2 | Status: SHIPPED | OUTPATIENT
Start: 2024-06-12

## 2024-06-12 RX ORDER — HYDROXYZINE 50 MG/1
TABLET, FILM COATED ORAL
COMMUNITY
Start: 2024-04-01

## 2024-06-12 RX ORDER — CYCLOBENZAPRINE HCL 10 MG
10 TABLET ORAL NIGHTLY PRN
Qty: 30 TABLET | Refills: 0 | Status: SHIPPED | OUTPATIENT
Start: 2024-06-12

## 2024-06-12 RX ORDER — TRAZODONE HYDROCHLORIDE 50 MG/1
TABLET ORAL NIGHTLY
COMMUNITY
Start: 2024-04-24

## 2024-06-17 NOTE — PROGRESS NOTES
Chief Complaint   Patient presents with    Physical     Annual physical exam       HPI:   Lora Rowland is a 58 year old female who presents for a complete physical without gyne exam.   Patient feels well, dental visit up to date, no hearing problem.  Vaccinations up to date.    LMP: post menopause  Sexual activity:not discussed  Contraception: postmenopausal  Exercise:  yoga .  Diet:  regular    Wt Readings from Last 3 Encounters:   06/12/24 124 lb 12.8 oz (56.6 kg)   03/20/24 123 lb 6.4 oz (56 kg)   01/29/24 124 lb 9.6 oz (56.5 kg)      BP Readings from Last 3 Encounters:   06/12/24 104/70   03/20/24 100/62   01/29/24 100/60     No LMP recorded. Patient is premenopausal.     Annual physical:  Overall pt states she feels well.     LMP: menopause  Sexually Active: not discussed  Last pap smear: 12/2019, w/ Gyne Dr. Hoyt  Last mammogram: overdue, was ordered in 3/2024  Last colonoscopy: 2/2018 (rpt in 10 yrs)  Last DEXA Scan: n/a    Exercise: yoga  Diet: regular    Anxiety and depression f-u: Pt states she is doing well on her current medications.  She uses Zolpidem prn, not every night.  She also takes Clonazepam, but takes this every other day prn anxiety.  She never takes the 2 medications together. She has been sleeping well lately.    Previous HPI:  Pt has been struggling with sleep and insomnia.  Was Rx'd Lunesta at previous visit, but this is not covered by insurance.  She was also tried on Trazodone, but states this is not working for her.  She states that the Zolpidem worked well for her before.  She avoids caffeine late in the day. She exercises regularly with yoga.  She has a regular sleep schedule, asleep by 9-10pm and awake by 7a.      Previous HPI from 2/2024: Pt continues to have difficulty with falling asleep.  She was tried on Zolpidem 5 mg at previous visit, but states this is not working. She thought this was what her friend gave her, but turns out her friend told her it is Lunesta 3 mg.  She  uses the Conzepam 1 mg to help manage her anxiety, but uses this only every few days and does not take this with the sleeping medication.     Previous HPI: At previous visit was tried on Paroxetine, but pt states she did not notice any improvement. She takes the Hydroxyzine in the daytime, finds this helpful, She states this is not helping her sleep (50 mg Hydroxyzine). She takes the Clonazepam every other day.  She has difficulty with falling asleep, wakes often through the night.  If she has no medication, she gets only 2-3 hrs of sleep.  She exercises regularly, yoga, sleeps conssitently at 9p and wakes at 7a.  She continues to travel with a group of friends touring different countries. During one of the trips a friend let her try Zolpidem and pt states she had the best slepe after taking that medication.     Previous HPI from 10/2023: Pt states she tried the Sertraline as Rx'd last week, but pt states this made her fingers tingle so she stopped this and went back to taking Clonazepam.  She reports this medication works so much better.  She states also that Hydroxyzine has been a little helpful.  She is leaving for [place] next week and would like something for her upcoming trip.     Previous HPI: Pt had done 1 month without medication- she has been traveling a lot to [place], then to [place].  She notes feeling very anxious on the Citalopram. She also ha snot taken the Clonazepam, wants to stop this medication. She is open to trying a new medication.  She has gained about 3 lbs.  Appetite is good.  Sleep has been poor, due to jet lag.  She will be traveling to [place] next week. She has no SI and no plan.     Previous HPI from 5/2023:  She was first dx'd shortly after she became a  2 yrs ago.  Her  passed away unexpectedly in a tragic accident 2 yrs ago.  Then she lost her home.  She had a decreased appetite, lost 10 lbs. Was having difficulty sleeping.  She had been seeing Psycholgist, Dr. Sneed,  as well as in therapy.  She has been  On Citalopram and Clonazepam nightly, tolerating well.  She has no SI currently and no plan.                    Current Outpatient Medications   Medication Sig Dispense Refill    traZODone 50 MG Oral Tab Take 0.5-1 tablets (25-50 mg total) by mouth nightly. AT BEDTIME      hydrOXYzine 50 MG Oral Tab TAKE 1 TO 2 TABLETS BY MOUTH EVERY 6 HOURS AS NEEDED FOR ANXIETY OR SLEEP      zolpidem 5 MG Oral Tab Take 1 tablet (5 mg total) by mouth nightly as needed for Sleep. 30 tablet 2    clonazePAM 1 MG Oral Tab Take 1 tablet (1 mg total) by mouth daily as needed for Anxiety. 30 tablet 2    cyclobenzaprine 10 MG Oral Tab Take 1 tablet (10 mg total) by mouth nightly as needed for Muscle spasms. 30 tablet 0      Past Medical History:    Anemia    Myoma      Past Surgical History:   Procedure Laterality Date          Colonoscopy      Colonoscopy N/A 2018    Procedure: COLONOSCOPY;  Surgeon: BECCA Bhat MD;  [facility] ENDOSCOPY    D & c      Tubal ligation        Family History   Problem Relation Age of Onset    Hypertension Mother     Uterine Cancer Mother     Hypertension Father     Thyroid Disorder Sister     Breast Cancer Neg     Colon Cancer Neg       Social History:  Social History     Socioeconomic History    Marital status:    Tobacco Use    Smoking status: Every Day     Current packs/day: 0.33     Average packs/day: 0.3 packs/day for 24.5 years (8.1 ttl pk-yrs)     Types: Cigarettes     Start date:     Smokeless tobacco: Never    Tobacco comments:     3 cigs per day    Vaping Use    Vaping status: Never Used   Substance and Sexual Activity    Alcohol use: Not Currently     Alcohol/week: 0.0 standard drinks of alcohol     Comment: socially    Drug use: Yes     Types: Cannabis     Comment: CBD oil fat night     Sexual activity: Yes     Partners: Male     Birth control/protection: Tubal Ligation   Other Topics Concern    Caffeine Concern  Yes     Comment: 1 cup coffee daily   Social History Narrative    The patient does not use an assistive device..      The patient does live in a home with stairs.     Social Determinants of Health      [facility]    Social Connections       Allergies:  No Known Allergies     REVIEW OF SYSTEMS:     Review of Systems   Constitutional:  Positive for fatigue. Negative for appetite change.   Gastrointestinal:  Negative for abdominal pain, constipation, diarrhea, nausea and vomiting.   Genitourinary:  Negative for dysuria.   Psychiatric/Behavioral:  Negative for dysphoric mood, sleep disturbance and suicidal ideas. The patient is not nervous/anxious.         EXAM:   /70 (BP Location: Left arm, Patient Position: Sitting, Cuff Size: adult)   Pulse 75   Temp 98.1 °F (36.7 °C) (Temporal)   Resp 16   Ht 5' 1\" (1.549 m)   Wt 124 lb 12.8 oz (56.6 kg)   SpO2 98%   BMI 23.58 kg/m²    GENERAL: WD/WN in no acute distress.   HEENT: PERRLA and EOMI.  OP moist no lesions.TM WNL, sheila.Normal ears canals bilaterally.  Neck is supple, with no cervical LAD or thyroid abnormalities.  LUNGS: are clear to auscultation bilaterally, with no wheeze, rhonchi, or rales.   HEART: is RRR.  S1, S2, with no murmurs,clicks, gallops  BREAST:deferred  ABDOMEN: is soft,NBS, NT/ND with no HSM.  No rebound or guarding. No CVA tenderness, no hernias.   EXAM: deferred  RECTAL EXAM: deferred  NEURO: Cranial nerves II-XII normal,no focal abnormalities, and reflexes coordination and gait normal and symmetric.Sensation intact.  EXTREMETIES: are symmetric with no cyanosis, clubbing, or edema.  MS: Normal muscles tones, no joints abnormalities.  SKIN: Normal color, turgor, no lesions, rashes or wounds.  PSYCH: normal affect and mood.    ASSESSMENT AND PLAN:     58 year old female with     1. Annual physical exam  Routine labs ordered today, await results. Counseled pt on healthy  lifestyle changes. Vaccines today: Shingles today . Contraception: postmenopausal .    Last pap smear: 12/2019, w/ Gyne Dr. Hoyt  Last mammogram: overdue, was ordered in 3/2024  Last colonoscopy: 2/2018 (rpt in 10 yrs)  Last DEXA Scan: n/a     - CBC With Differential With Platelet  - Comp Metabolic Panel  - Lipid Panel  - TSH W Reflex To Free T4  - Zoster Recombinant Adjuvanted (Shingrix -Shingles) [[id number]]    2. Anxiety and depression  - is on meds as listed below  - mood is stable    - clonazePAM 1 MG Oral Tab; Take 1 tablet (1 mg total) by mouth daily as needed for Anxiety.  Dispense: 30 tablet; Refill: 2  - cyclobenzaprine 10 MG Oral Tab; Take 1 tablet (10 mg total) by mouth nightly as needed for Muscle spasms.  Dispense: 30 tablet; Refill: 0    3. Insomnia, unspecified type  - pt intolerant to Citalopram and tried on Sertraline and Paroxetine in the past  - wants to stop taking Clonzepam (she continues to take Clonazepam 1 mg every other day for now)  - Hydroxyzine not helping with sleep   - was tried on Lunesta- not covered by insurance  - start Zolpidem 5 mg at bedtime prn  -  DO NOT take with Clonzepam or drink EtOH on these medications)  - medication f-u in 4-6 wks or sooner prn    - zolpidem 5 MG Oral Tab; Take 1 tablet (5 mg total) by mouth nightly as needed for Sleep.  Dispense: 30 tablet; Refill: 2    4. Screening for endocrine, nutritional, metabolic and immunity disorder    - Comp Metabolic Panel  - TSH W Reflex To Free T4    5. Screening for heart disease    - Lipid Panel    6. Screening for iron deficiency anemia    - CBC With Differential With Platelet    7. Need for vaccination    - Zoster Recombinant Adjuvanted (Shingrix -Shingles) [[id number]]    8. History of tobacco use  - smoking cessation f/w pt. Not ready to quit  - smoing a few cigarettes/day  - Smoking Cessation less than 3 minutes        Pt's was recommended low fat diet and aerobic exercise 30 minutes three times weekly.   Health  maintenance.   Osteoporosis prevention addressed  Recommended whole food type diet, eliminate processed food/low sugar and low sat fat diet      The patient indicates understanding of these issues and agrees to the plan.    Return in about 3 months (around 9/12/2024) for anxiety, insomnia medication f-u.

## 2024-06-25 ENCOUNTER — HOSPITAL ENCOUNTER (OUTPATIENT)
Dept: MAMMOGRAPHY | Age: 59
Discharge: HOME | End: 2024-06-25
Attending: FAMILY MEDICINE

## 2024-06-25 ENCOUNTER — LAB ENCOUNTER (OUTPATIENT)
Dept: LAB | Facility: REFERENCE LAB | Age: 59
End: 2024-06-25
Attending: FAMILY MEDICINE

## 2024-06-25 DIAGNOSIS — Z12.31 ENCOUNTER FOR SCREENING MAMMOGRAM FOR MALIGNANT NEOPLASM OF BREAST: ICD-10-CM

## 2024-06-25 LAB
ALBUMIN SERPL-MCNC: 4.3 G/DL (ref 3.2–4.8)
ALBUMIN/GLOB SERPL: 1.7 {RATIO} (ref 1–2)
ALP LIVER SERPL-CCNC: 63 U/L
ALT SERPL-CCNC: 16 U/L
ANION GAP SERPL CALC-SCNC: 4 MMOL/L (ref 0–18)
AST SERPL-CCNC: 23 U/L (ref ?–34)
BASOPHILS # BLD AUTO: 0.06 X10(3) UL (ref 0–0.2)
BASOPHILS NFR BLD AUTO: 0.7 %
BILIRUB SERPL-MCNC: 0.5 MG/DL (ref 0.3–1.2)
BUN BLD-MCNC: 11 MG/DL (ref 9–23)
BUN/CREAT SERPL: 13.9 (ref 10–20)
CALCIUM BLD-MCNC: 9.7 MG/DL (ref 8.7–10.4)
CHLORIDE SERPL-SCNC: 109 MMOL/L (ref 98–112)
CHOLEST SERPL-MCNC: 214 MG/DL (ref ?–200)
CO2 SERPL-SCNC: 27 MMOL/L (ref 21–32)
CREAT BLD-MCNC: 0.79 MG/DL
DEPRECATED RDW RBC AUTO: 45 FL (ref 35.1–46.3)
EGFRCR SERPLBLD CKD-EPI 2021: 88 ML/MIN/1.73M2 (ref 60–?)
EOSINOPHIL # BLD AUTO: 0.39 X10(3) UL (ref 0–0.7)
EOSINOPHIL NFR BLD AUTO: 4.5 %
ERYTHROCYTE [DISTWIDTH] IN BLOOD BY AUTOMATED COUNT: 13.2 % (ref 11–15)
FASTING PATIENT LIPID ANSWER: YES
FASTING STATUS PATIENT QL REPORTED: YES
GLOBULIN PLAS-MCNC: 2.6 G/DL (ref 2–3.5)
GLUCOSE BLD-MCNC: 81 MG/DL (ref 70–99)
HCT VFR BLD AUTO: 40.3 %
HDLC SERPL-MCNC: 87 MG/DL (ref 40–59)
HGB BLD-MCNC: 13.1 G/DL
IMM GRANULOCYTES # BLD AUTO: 0.02 X10(3) UL (ref 0–1)
IMM GRANULOCYTES NFR BLD: 0.2 %
LDLC SERPL CALC-MCNC: 111 MG/DL (ref ?–100)
LYMPHOCYTES # BLD AUTO: 3.47 X10(3) UL (ref 1–4)
LYMPHOCYTES NFR BLD AUTO: 40.2 %
MCH RBC QN AUTO: 30 PG (ref 26–34)
MCHC RBC AUTO-ENTMCNC: 32.5 G/DL (ref 31–37)
MCV RBC AUTO: 92.2 FL
MONOCYTES # BLD AUTO: 0.52 X10(3) UL (ref 0.1–1)
MONOCYTES NFR BLD AUTO: 6 %
NEUTROPHILS # BLD AUTO: 4.18 X10 (3) UL (ref 1.5–7.7)
NEUTROPHILS # BLD AUTO: 4.18 X10(3) UL (ref 1.5–7.7)
NEUTROPHILS NFR BLD AUTO: 48.4 %
NONHDLC SERPL-MCNC: 127 MG/DL (ref ?–130)
OSMOLALITY SERPL CALC.SUM OF ELEC: 288 MOSM/KG (ref 275–295)
PLATELET # BLD AUTO: 342 10(3)UL (ref 150–450)
POTASSIUM SERPL-SCNC: 4.1 MMOL/L (ref 3.5–5.1)
PROT SERPL-MCNC: 6.9 G/DL (ref 5.7–8.2)
RBC # BLD AUTO: 4.37 X10(6)UL
SODIUM SERPL-SCNC: 140 MMOL/L (ref 136–145)
TRIGL SERPL-MCNC: 91 MG/DL (ref 30–149)
TSI SER-ACNC: 1.03 MIU/ML (ref 0.55–4.78)
VLDLC SERPL CALC-MCNC: 16 MG/DL (ref 0–30)
WBC # BLD AUTO: 8.6 X10(3) UL (ref 4–11)

## 2024-06-25 PROCEDURE — 77067 SCR MAMMO BI INCL CAD: CPT | Performed by: FAMILY MEDICINE

## 2024-06-25 PROCEDURE — 84443 ASSAY THYROID STIM HORMONE: CPT | Performed by: FAMILY MEDICINE

## 2024-06-25 PROCEDURE — 80061 LIPID PANEL: CPT | Performed by: FAMILY MEDICINE

## 2024-06-25 PROCEDURE — 36415 COLL VENOUS BLD VENIPUNCTURE: CPT | Performed by: FAMILY MEDICINE

## 2024-06-25 PROCEDURE — 77063 BREAST TOMOSYNTHESIS BI: CPT | Performed by: FAMILY MEDICINE

## 2024-06-25 PROCEDURE — 80053 COMPREHEN METABOLIC PANEL: CPT | Performed by: FAMILY MEDICINE

## 2024-06-25 PROCEDURE — 85025 COMPLETE CBC W/AUTO DIFF WBC: CPT | Performed by: FAMILY MEDICINE

## 2024-07-03 ENCOUNTER — TELEPHONE (OUTPATIENT)
Dept: FAMILY MEDICINE CLINIC | Facility: CLINIC | Age: 59
End: 2024-07-03

## 2024-07-03 DIAGNOSIS — F41.9 ANXIETY AND DEPRESSION: ICD-10-CM

## 2024-07-03 DIAGNOSIS — G47.00 INSOMNIA, UNSPECIFIED TYPE: ICD-10-CM

## 2024-07-03 DIAGNOSIS — F32.A ANXIETY AND DEPRESSION: ICD-10-CM

## 2024-07-03 RX ORDER — CLONAZEPAM 1 MG/1
1 TABLET ORAL DAILY PRN
Qty: 30 TABLET | Refills: 2 | OUTPATIENT
Start: 2024-07-03

## 2024-07-03 RX ORDER — TRAZODONE HYDROCHLORIDE 50 MG/1
TABLET ORAL NIGHTLY
Qty: 30 TABLET | Refills: 0 | OUTPATIENT
Start: 2024-07-03

## 2024-07-03 RX ORDER — ZOLPIDEM TARTRATE 5 MG/1
5 TABLET ORAL NIGHTLY PRN
Qty: 30 TABLET | Refills: 2 | OUTPATIENT
Start: 2024-07-03

## 2024-07-03 RX ORDER — HYDROXYZINE 50 MG/1
50 TABLET, FILM COATED ORAL
Qty: 30 TABLET | Refills: 0 | OUTPATIENT
Start: 2024-07-03

## 2024-07-03 RX ORDER — CYCLOBENZAPRINE HCL 10 MG
10 TABLET ORAL NIGHTLY PRN
Qty: 30 TABLET | Refills: 0 | Status: SHIPPED | OUTPATIENT
Start: 2024-07-03

## 2024-07-03 NOTE — TELEPHONE ENCOUNTER
----- Message from Sena Johnston sent at 6/27/2024 10:36 AM CDT -----  Please call pt with results (Citizen of [place] speaking):    Your lab results are in and overall look good:  Your blood count is normal- no signs of anemia.    Your electrolytes (sodium, potassium, chloride) are all normal. Your liver and kidney tests are normal. Your blood sugar is normal (no diabetes).    Your cholesterol levels are borderline elevated. No cholesterol lowering medication is indicated at this time, but recommended is a healthy diet low in saturated fat (which is found in animal fats, processed foods and fast foods) as well as a regular exercise regimen to maintain a healthy weight (the current guidelines recommend 150 minutes weekly of physical activity).     Your thyroid test is normal.    I recommend to repeat your labs at your next physical in 1 year. Please let me know if you have any questions!    Take care,  Dr. VARGAS

## 2024-07-03 NOTE — TELEPHONE ENCOUNTER
Refill no protocol available:     Pt requesting refill of   Requested Prescriptions     Pending Prescriptions Disp Refills    traZODone 50 MG Oral Tab 30 tablet 0     Sig: Take 0.5-1 tablets (25-50 mg total) by mouth nightly. AT BEDTIME    hydrOXYzine 50 MG Oral Tab 30 tablet 0     Sig: Take 1 tablet (50 mg total) by mouth.    zolpidem 5 MG Oral Tab 30 tablet 2     Sig: Take 1 tablet (5 mg total) by mouth nightly as needed for Sleep.    clonazePAM 1 MG Oral Tab 30 tablet 2     Sig: Take 1 tablet (1 mg total) by mouth daily as needed for Anxiety.    cyclobenzaprine 10 MG Oral Tab 30 tablet 0     Sig: Take 1 tablet (10 mg total) by mouth nightly as needed for Muscle spasms.        Sent to Provider for review:    Last Time Medication was Filled:    Clonazepam 6/12/2024  Cyclobenzaprine 6/12/2024  Zolpidem 6/12/2024  Hydroxyzine 4/1/2024  Trazone 4/24/2024    Last Office Visit with Provider: 6/12/2024    No future appointments.     LMP

## 2024-07-04 NOTE — TELEPHONE ENCOUNTER
Ok to fill Cyclobenzaprine. Signed.    Not sure why she is asking for refills since the Zolpidem and Clonazapem were filled for 30 day supply (with 2 refills each).    Hydroxyzine she was no longer taking, since it was not helping her.    Trazodone was stopped back in 3/2024.    She needs to clarify or if she needs to discuss further, then I advise she makes an appt to sort out her medication so she isn't taking so much and we can calrify what is safe to take.    Thanks.      See below from my last note from 6/2024:    2. Anxiety and depression  - is on meds as listed below  - mood is stable     - clonazePAM 1 MG Oral Tab; Take 1 tablet (1 mg total) by mouth daily as needed for Anxiety.  Dispense: 30 tablet; Refill: 2  - cyclobenzaprine 10 MG Oral Tab; Take 1 tablet (10 mg total) by mouth nightly as needed for Muscle spasms.  Dispense: 30 tablet; Refill: 0     3. Insomnia, unspecified type  - pt intolerant to Citalopram and tried on Sertraline and Paroxetine in the past  - wants to stop taking Clonzepam (she continues to take Clonazepam 1 mg every other day for now)  - Hydroxyzine not helping with sleep   - was tried on Lunesta- not covered by insurance  - start Zolpidem 5 mg at bedtime prn  -  DO NOT take with Clonzepam or drink EtOH on these medications)  - medication f-u in 4-6 wks or sooner prn     - zolpidem 5 MG Oral Tab; Take 1 tablet (5 mg total) by mouth nightly as needed for Sleep.  Dispense: 30 tablet; Refill: 2

## 2024-07-12 NOTE — TELEPHONE ENCOUNTER
Outreach call to pt, notified of test results and discussed diet and lifestyle modifications in [place] a recommended by PCP. Pt aware to follow up and repeat labs at annual physical.   Pt voiced understanding

## 2024-07-17 ENCOUNTER — TELEPHONE (OUTPATIENT)
Dept: FAMILY MEDICINE CLINIC | Facility: CLINIC | Age: 59
End: 2024-07-17

## 2024-07-17 NOTE — TELEPHONE ENCOUNTER
Prior authorization completed for Clonazepam attached lab work.    Placed at providers bin for signature and review  
Him/He

## 2024-08-19 ENCOUNTER — OFFICE VISIT (OUTPATIENT)
Dept: OBGYN CLINIC | Facility: CLINIC | Age: 59
End: 2024-08-19

## 2024-08-19 VITALS
HEART RATE: 79 BPM | DIASTOLIC BLOOD PRESSURE: 71 MMHG | HEIGHT: 61 IN | WEIGHT: 122.81 LBS | BODY MASS INDEX: 23.19 KG/M2 | SYSTOLIC BLOOD PRESSURE: 108 MMHG

## 2024-08-19 DIAGNOSIS — Z01.419 WELL WOMAN EXAM WITH ROUTINE GYNECOLOGICAL EXAM: Primary | ICD-10-CM

## 2024-08-19 PROCEDURE — 99386 PREV VISIT NEW AGE 40-64: CPT | Performed by: OBSTETRICS & GYNECOLOGY

## 2024-08-19 NOTE — PROGRESS NOTES
Lora Rowland is a 58 year old female  Patient's last menstrual period was 2017 (approximate).   Chief Complaint   Patient presents with    Gyn Exam     Annual exam   Presenting for well woman exam. Last pap smear was normal . Mammogram was normal on 2024. No menses for past 3 years. Is taking Estroven for hot flashes.     OBSTETRICS HISTORY:  OB History    Para Term  AB Living   4 4 4 0 0 4   SAB IAB Ectopic Multiple Live Births   0 0 0 0 4       GYNE HISTORY:  Patient's last menstrual period was 2017 (approximate).    History   Sexual Activity    Sexual activity: Yes    Partners: Male    Birth control/ protection: Tubal Ligation        Hx Prior Abnormal Pap: Yes  Pap Date: 19  Pap Result Notes: negative, HPV negative. Per pt had pap in [place] around  and wnl.      MEDICAL HISTORY:  Past Medical History:    Anemia    Myoma         SURGICAL HISTORY:  Past Surgical History:   Procedure Laterality Date          Colonoscopy      Colonoscopy N/A 2018    Procedure: COLONOSCOPY;  Surgeon: BECCA Bhat MD;  Location: [facility] ENDOSCOPY    D & c      Tubal ligation         SOCIAL HISTORY:  Social History     Socioeconomic History    Marital status:      Spouse name: Not on file    Number of children: Not on file    Years of education: Not on file    Highest education level: Not on file   Occupational History    Not on file   Tobacco Use    Smoking status: Every Day     Current packs/day: 0.33     Average packs/day: 0.3 packs/day for 24.6 years (8.1 ttl pk-yrs)     Types: Cigarettes     Start date:     Smokeless tobacco: Never    Tobacco comments:     3 cigs per day    Vaping Use    Vaping status: Never Used   Substance and Sexual Activity    Alcohol use: Not Currently     Alcohol/week: 0.0 standard drinks of alcohol     Comment: socially    Drug use: Yes     Types: Cannabis     Comment: CBD oil fat night     Sexual activity: Yes      Partners: Male     Birth control/protection: Tubal Ligation   Other Topics Concern     Service Not Asked    Blood Transfusions Not Asked    Caffeine Concern Yes     Comment: 1 cup coffee daily    Occupational Exposure Not Asked    Hobby Hazards Not Asked    Sleep Concern Not Asked    Stress Concern Not Asked    Weight Concern Not Asked    Special Diet Not Asked    Back Care Not Asked    Exercise Not Asked    Bike Helmet Not Asked    Seat Belt Not Asked    Self-Exams Not Asked   Social History Narrative    The patient does not use an assistive device..      The patient does live in a home with stairs.     Social Determinants of Health     Financial Resource Strain: Not on file   Food Insecurity: Not on file   Transportation Needs: Not on file   Physical Activity: Not on file   Stress: Not on file   Social Connections: Unknown (3/8/2021)    [facility]    Social Connections     Conversations with friends/family/neighbors per week: Not on file   Housing Stability: Not on file         Depression Screening (PHQ-2/PHQ-9): Over the LAST 2 WEEKS   Little interest or pleasure in doing things (over the last two weeks)?: Not at all    Feeling down, depressed, or hopeless (over the last two weeks)?: More than half the days    PHQ-2 SCORE: 2   1.    2.    3.    4.    5.    6.    7.    8.    9.                 MEDICATIONS:    Current Outpatient Medications:     cyclobenzaprine 10 MG Oral Tab, Take 1 tablet (10 mg total) by mouth nightly as needed for Muscle spasms., Disp: 30 tablet, Rfl: 0    traZODone 50 MG Oral Tab, Take 0.5-1 tablets (25-50 mg total) by mouth nightly. AT BEDTIME, Disp: , Rfl:     hydrOXYzine 50 MG Oral Tab, TAKE 1 TO 2 TABLETS BY MOUTH EVERY 6 HOURS AS NEEDED FOR ANXIETY OR SLEEP, Disp: , Rfl:     zolpidem 5 MG Oral Tab, Take 1 tablet (5 mg total) by mouth nightly as needed for Sleep., Disp: 30 tablet, Rfl: 2    clonazePAM 1 MG Oral Tab,  Take 1 tablet (1 mg total) by mouth daily as needed for Anxiety., Disp: 30 tablet, Rfl: 2    ALLERGIES:  No Known Allergies      Review of Systems:  Review of Systems   All other systems reviewed and are negative.       Vitals:    08/19/24 1009   BP: 108/71   Pulse: 79       PHYSICAL EXAM:   Physical Exam  Vitals reviewed.   Constitutional:       Appearance: Normal appearance.   HENT:      Head: Atraumatic.   Eyes:      Pupils: Pupils are equal, round, and reactive to light.   Pulmonary:      Effort: Pulmonary effort is normal.   Chest:   Breasts:     Right: Normal. No bleeding, inverted nipple, mass, nipple discharge, skin change or tenderness.      Left: Normal. No bleeding, inverted nipple, mass, nipple discharge, skin change or tenderness.   Abdominal:      General: Abdomen is flat.      Palpations: Abdomen is soft.      Tenderness: There is no abdominal tenderness.   Genitourinary:     General: Normal vulva.      Exam position: Lithotomy position.      Labia:         Right: No rash, tenderness, lesion or injury.         Left: No rash, tenderness, lesion or injury.       Vagina: Normal.      Cervix: Normal.      Uterus: Normal. Not tender.       Adnexa: Right adnexa normal and left adnexa normal.        Right: No tenderness or fullness.          Left: No tenderness or fullness.     Lymphadenopathy:      Upper Body:      Right upper body: No supraclavicular, axillary or pectoral adenopathy.      Left upper body: No supraclavicular, axillary or pectoral adenopathy.   Skin:     General: Skin is warm and dry.   Neurological:      General: No focal deficit present.      Mental Status: She is alert and oriented to person, place, and time.   Psychiatric:         Mood and Affect: Mood normal.         Behavior: Behavior normal.         Thought Content: Thought content normal.         Judgment: Judgment normal.           Assessment & Plan:  Lora was seen today for gyn exam.    Diagnoses and all orders for this  visit:    Well woman exam with routine gynecological exam  -     ThinPrep PAP Smear  -     Hpv Dna  High Risk , Thin Prep Collect        Requested Prescriptions      No prescriptions requested or ordered in this encounter       Pap with HPV done. New ASSCP guidelines reviewed in detail. Annual exams encouraged. Mammogram completed. Call if any vaginal bleeding.  Encouraged 1500 mg calcium w/ vit D.  Encouraged weight bearing exercise.  Follow-up in one year.

## 2024-08-20 LAB — HPV E6+E7 MRNA CVX QL NAA+PROBE: NEGATIVE

## 2024-08-20 RX ORDER — TRAZODONE HYDROCHLORIDE 50 MG/1
TABLET, FILM COATED ORAL NIGHTLY
Qty: 30 TABLET | Refills: 0 | OUTPATIENT
Start: 2024-08-20

## 2024-08-20 RX ORDER — HYDROXYZINE HYDROCHLORIDE 50 MG/1
TABLET, FILM COATED ORAL
Qty: 60 TABLET | Refills: 0 | OUTPATIENT
Start: 2024-08-20

## 2024-08-20 NOTE — TELEPHONE ENCOUNTER
Refill no protocol available:     Pt requesting refill of   Requested Prescriptions     Pending Prescriptions Disp Refills    TRAZODONE 50 MG Oral Tab [Pharmacy Med Name: TRAZODONE 50MG TABLETS] 30 tablet 0     Sig: TAKE 1/2 TO 1 TABLET BY MOUTH EVERY NIGHT AT BEDTIME    HYDROXYZINE 50 MG Oral Tab [Pharmacy Med Name: HYDROXYZINE HCL 50MG TABS (WHITE)] 60 tablet 0     Sig: TAKE 1 TO 2 TABLETS BY MOUTH EVERY 6 HOURS AS NEEDED FOR ANXIETY OR SLEEP           Sent to Provider for review:    Last Time Medication was Filled:  historical reported medications     Last Office Visit with Provider: 06/12/2024 OV noted copied   3. Insomnia, unspecified type  - pt intolerant to Citalopram and tried on Sertraline and Paroxetine in the past  - wants to stop taking Clonzepam (she continues to take Clonazepam 1 mg every other day for now)  - Hydroxyzine not helping with sleep   - was tried on Lunesta- not covered by insurance  - start Zolpidem 5 mg at bedtime prn  -  DO NOT take with Clonzepam or drink EtOH on these medications)  - medication f-u in 4-6 wks or sooner prn     - zolpidem 5 MG Oral Tab; Take 1 tablet (5 mg total) by mouth nightly as needed for Sleep.  Dispense: 30 tablet; Refill: 2    No future appointments.

## 2024-09-20 DIAGNOSIS — G47.00 INSOMNIA, UNSPECIFIED TYPE: ICD-10-CM

## 2024-09-23 RX ORDER — ZOLPIDEM TARTRATE 5 MG/1
5 TABLET ORAL NIGHTLY PRN
Qty: 30 TABLET | Refills: 0 | Status: SHIPPED | OUTPATIENT
Start: 2024-09-23

## 2024-09-23 NOTE — TELEPHONE ENCOUNTER
Pt requesting refill of   Requested Prescriptions     Pending Prescriptions Disp Refills    zolpidem 5 MG Oral Tab 30 tablet 0     Sig: Take 1 tablet (5 mg total) by mouth nightly as needed for Sleep.     Last Time Medication was Filled:  6/12/2024 30 DAYS 2 REFILLS    Last Office Visit with Provider: 6/12/2024   Insomnia, unspecified type  - pt intolerant to Citalopram and tried on Sertraline and Paroxetine in the past  - wants to stop taking Clonzepam (she continues to take Clonazepam 1 mg every other day for now)  - Hydroxyzine not helping with sleep   - was tried on Lunesta- not covered by insurance  - start Zolpidem 5 mg at bedtime prn  -  DO NOT take with Clonzepam or drink EtOH on these medications)  - medication f-u in 4-6 wks or sooner prn    No future appointments.  Return in about 3 months (around 9/12/2024) for anxiety, insomnia medication f-u.

## 2024-09-23 NOTE — TELEPHONE ENCOUNTER
I sent 30-day refill this time. Pt last seen 6/2024, is due for medication f-u appt.  Zolpidem is a controlled substance, I need to see her every 3 months for this (in office or video is ok). Thanks.

## 2024-10-13 DIAGNOSIS — G47.00 INSOMNIA, UNSPECIFIED TYPE: ICD-10-CM

## 2024-10-13 DIAGNOSIS — F41.9 ANXIETY AND DEPRESSION: ICD-10-CM

## 2024-10-13 DIAGNOSIS — F32.A ANXIETY AND DEPRESSION: ICD-10-CM

## 2024-10-14 DIAGNOSIS — G47.00 INSOMNIA, UNSPECIFIED TYPE: ICD-10-CM

## 2024-10-14 RX ORDER — CLONAZEPAM 1 MG/1
1 TABLET ORAL DAILY PRN
Qty: 30 TABLET | Refills: 0 | OUTPATIENT
Start: 2024-10-14

## 2024-10-14 RX ORDER — ZOLPIDEM TARTRATE 5 MG/1
5 TABLET ORAL NIGHTLY PRN
Qty: 30 TABLET | Refills: 0 | OUTPATIENT
Start: 2024-10-14

## 2024-10-14 NOTE — TELEPHONE ENCOUNTER
Refill Failed Protocol:     Pt requesting refill of   Requested Prescriptions     Refused Prescriptions Disp Refills    zolpidem 5 MG Oral Tab 30 tablet 0     Sig: Take 1 tablet (5 mg total) by mouth nightly as needed for Sleep.     Last Time Medication was Filled:  9/23/2024    Last Office Visit with Provider: 6/12/2024    Unable to approve refill, patient is due for follow up appointment       No future appointments.

## 2024-10-14 NOTE — TELEPHONE ENCOUNTER
Patient is due for a follow up appointment    Pt requesting refill of   Requested Prescriptions     Pending Prescriptions Disp Refills    clonazePAM 1 MG Oral Tab 30 tablet 0     Sig: Take 1 tablet (1 mg total) by mouth daily as needed for Anxiety.        Sent to Provider for review:    Last Time Medication was Filled:  6/12/2024    Last Office Visit with Provider: 6/12/2024  Anxiety and depression  - is on meds as listed below  - mood is stable     - clonazePAM 1 MG Oral Tab; Take 1 tablet (1 mg total) by mouth daily as needed for Anxiety.  Dispense: 30 tablet; Refill: 2  - cyclobenzaprine 10 MG Oral Tab; Take 1 tablet (10 mg total) by mouth nightly as needed for Muscle spasms.  Dispense: 30 tablet; Refill: 0    Return in about 3 months (around 9/12/2024) for anxiety, insomnia medication f-u.   No future appointments.

## 2024-10-17 ENCOUNTER — OFFICE VISIT (OUTPATIENT)
Dept: FAMILY MEDICINE CLINIC | Facility: CLINIC | Age: 59
End: 2024-10-17
Payer: MEDICAID

## 2024-10-17 VITALS
HEART RATE: 75 BPM | WEIGHT: 124.81 LBS | OXYGEN SATURATION: 98 % | DIASTOLIC BLOOD PRESSURE: 74 MMHG | SYSTOLIC BLOOD PRESSURE: 116 MMHG | BODY MASS INDEX: 24 KG/M2 | TEMPERATURE: 98 F | RESPIRATION RATE: 18 BRPM

## 2024-10-17 DIAGNOSIS — F41.9 ANXIETY AND DEPRESSION: ICD-10-CM

## 2024-10-17 DIAGNOSIS — F32.A ANXIETY AND DEPRESSION: ICD-10-CM

## 2024-10-17 DIAGNOSIS — Z23 NEED FOR VACCINATION: ICD-10-CM

## 2024-10-17 DIAGNOSIS — G47.00 INSOMNIA, UNSPECIFIED TYPE: Primary | ICD-10-CM

## 2024-10-17 PROCEDURE — 90656 IIV3 VACC NO PRSV 0.5 ML IM: CPT | Performed by: FAMILY MEDICINE

## 2024-10-17 PROCEDURE — 90472 IMMUNIZATION ADMIN EACH ADD: CPT | Performed by: FAMILY MEDICINE

## 2024-10-17 PROCEDURE — 99214 OFFICE O/P EST MOD 30 MIN: CPT | Performed by: FAMILY MEDICINE

## 2024-10-17 PROCEDURE — 90471 IMMUNIZATION ADMIN: CPT | Performed by: FAMILY MEDICINE

## 2024-10-17 PROCEDURE — 90750 HZV VACC RECOMBINANT IM: CPT | Performed by: FAMILY MEDICINE

## 2024-10-17 RX ORDER — ZOLPIDEM TARTRATE 5 MG/1
5 TABLET ORAL NIGHTLY PRN
Qty: 30 TABLET | Refills: 0 | OUTPATIENT
Start: 2024-10-17

## 2024-10-17 RX ORDER — ZOLPIDEM TARTRATE 5 MG/1
5 TABLET ORAL NIGHTLY PRN
Qty: 30 TABLET | Refills: 2 | Status: SHIPPED | OUTPATIENT
Start: 2024-10-17

## 2024-10-17 RX ORDER — CLONAZEPAM 1 MG/1
1 TABLET ORAL DAILY PRN
Qty: 30 TABLET | Refills: 2 | Status: SHIPPED | OUTPATIENT
Start: 2024-10-17

## 2024-10-17 RX ORDER — CYCLOBENZAPRINE HCL 10 MG
10 TABLET ORAL NIGHTLY PRN
Qty: 30 TABLET | Refills: 1 | Status: SHIPPED | OUTPATIENT
Start: 2024-10-17

## 2024-10-17 NOTE — PROGRESS NOTES
CHIEF COMPLAINT:   Chief Complaint   Patient presents with    Medication Follow-Up         HPI:     Lora Rowland is a 59 year old female presents for medication f-u for anxiety and insomnia.    Anxiety and depression f-u: Pt states she is doing well on her current medications.  She uses Zolpidem prn, not every night.  She also takes Clonazepam, but takes this every other day prn anxiety.  She never takes the 2 medications together. She has been sleeping well lately. She will be travelling to [place] touring [place] and surrounding area with her friends. Then she travels to      Previous HPI:  Pt has been struggling with sleep and insomnia.  Was Rx'd Lunesta at previous visit, but this is not covered by insurance.  She was also tried on Trazodone, but states this is not working for her.  She states that the Zolpidem worked well for her before.  She avoids caffeine late in the day. She exercises regularly with yoga.  She has a regular sleep schedule, asleep by 9-10pm and awake by 7a.      Previous HPI from 2/2024: Pt continues to have difficulty with falling asleep.  She was tried on Zolpidem 5 mg at previous visit, but states this is not working. She thought this was what her friend gave her, but turns out her friend told her it is Lunesta 3 mg.  She uses the Conzepam 1 mg to help manage her anxiety, but uses this only every few days and does not take this with the sleeping medication.     Previous HPI: At previous visit was tried on Paroxetine, but pt states she did not notice any improvement. She takes the Hydroxyzine in the daytime, finds this helpful, She states this is not helping her sleep (50 mg Hydroxyzine). She takes the Clonazepam every other day.  She has difficulty with falling asleep, wakes often through the night.  If she has no medication, she gets only 2-3 hrs of sleep.  She exercises regularly, yoga, sleeps conssitently at 9p and wakes at 7a.  She continues to travel with a group of friends touring  different countries. During one of the trips a friend let her try Zolpidem and pt states she had the best slepe after taking that medication.     Previous HPI from 10/2023: Pt states she tried the Sertraline as Rx'd last week, but pt states this made her fingers tingle so she stopped this and went back to taking Clonazepam.  She reports this medication works so much better.  She states also that Hydroxyzine has been a little helpful.  She is leaving for [place] next week and would like something for her upcoming trip.     Previous HPI: Pt had done 1 month without medication- she has been traveling a lot to [place], then to [place].  She notes feeling very anxious on the Citalopram. She also ha snot taken the Clonazepam, wants to stop this medication. She is open to trying a new medication.  She has gained about 3 lbs.  Appetite is good.  Sleep has been poor, due to jet lag.  She will be traveling to [facility]] next week. She has no SI and no plan.     Previous HPI from 2023:  She was first dx'd shortly after she became a  2 yrs ago.  Her  passed away unexpectedly in a tragic accident 2 yrs ago.  Then she lost her home.  She had a decreased appetite, lost 10 lbs. Was having difficulty sleeping.  She had been seeing Psycholgist, Dr. Sneed, as well as in therapy.  She has been  On Citalopram and Clonazepam nightly, tolerating well.  She has no SI currently and no plan.                                HISTORY:  Past Medical History:    Anemia    Myoma      Past Surgical History:   Procedure Laterality Date          Colonoscopy      Colonoscopy N/A 2018    Procedure: COLONOSCOPY;  Surgeon: BECCA Bhat MD;  Location: [facility] ENDOSCOPY    D & c      Tubal ligation        Family History   Problem Relation Age of Onset    Hypertension Mother     Uterine Cancer Mother     Hypertension Father     Thyroid Disorder Sister     Breast Cancer Neg     Colon Cancer Neg       Social History:    Social History     Socioeconomic History    Marital status:    Tobacco Use    Smoking status: Every Day     Current packs/day: 0.33     Average packs/day: 0.3 packs/day for 24.8 years (8.2 ttl pk-yrs)     Types: Cigarettes     Start date: 2000    Smokeless tobacco: Never    Tobacco comments:     3 cigs per day    Vaping Use    Vaping status: Never Used   Substance and Sexual Activity    Alcohol use: Not Currently     Alcohol/week: 0.0 standard drinks of alcohol     Comment: socially    Drug use: Yes     Types: Cannabis     Comment: CBD oil fat night     Sexual activity: Yes     Partners: Male     Birth control/protection: Tubal Ligation   Other Topics Concern    Caffeine Concern Yes     Comment: 1 cup coffee daily   Social History Narrative    The patient does not use an assistive device..      The patient does live in a home with stairs.     Social Drivers of Health      [facility]    Social Connections        Medications (Active prior to today's visit):  Current Outpatient Medications   Medication Sig Dispense Refill    clonazePAM 1 MG Oral Tab Take 1 tablet (1 mg total) by mouth daily as needed for Anxiety. 30 tablet 2    cyclobenzaprine 10 MG Oral Tab Take 1 tablet (10 mg total) by mouth nightly as needed for Muscle spasms. 30 tablet 1    zolpidem 5 MG Oral Tab Take 1 tablet (5 mg total) by mouth nightly as needed for Sleep. 30 tablet 2       Allergies:  Allergies[1]    PSFH elements reviewed from today and agreed except as otherwise stated in HPI.  ROS:     Review of Systems   Constitutional:  Negative for appetite change, fatigue and unexpected weight change.   Cardiovascular:  Negative for chest pain, palpitations and leg swelling.   Psychiatric/Behavioral:  Negative for decreased concentration, dysphoric mood and sleep disturbance. The patient is not nervous/anxious.          Pertinent positives and negatives noted in the the  HPI.    PHYSICAL EXAM:   /74 (BP Location: Left arm, Patient Position: Sitting, Cuff Size: adult)   Pulse 75   Temp 97.8 °F (36.6 °C) (Temporal)   Resp 18   Wt 124 lb 12.8 oz (56.6 kg)   LMP 12/23/2017 (Approximate)   SpO2 98%   BMI 23.58 kg/m²   Vital signs reviewed.Appears stated age, well groomed.  Physical Exam  Vitals reviewed.   Constitutional:       Appearance: Normal appearance.   HENT:      Head: Normocephalic.   Cardiovascular:      Rate and Rhythm: Normal rate and regular rhythm.      Pulses: Normal pulses.      Heart sounds: Normal heart sounds.   Pulmonary:      Effort: Pulmonary effort is normal.      Breath sounds: Normal breath sounds.   Skin:     General: Skin is warm and dry.   Neurological:      Mental Status: She is alert and oriented to person, place, and time.   Psychiatric:         Behavior: Behavior normal.          LABS     Office Visit on 08/19/2024   Component Date Value    HPV High Risk 08/19/2024 Negative     HPV Source 08/19/2024 Cervical/endocervical     Interpretation/Result 08/19/2024 Negative for intraepithelial lesion or malignancy     Other Findings 08/19/2024 Atrophic pattern.     Specimen Adequacy 08/19/2024 Satisfactory for evaluation. Endocervical or metaplastic cells present     General Categorization 08/19/2024 Negative for intraepithelial lesion or malignancy     HPV High Risk mRNA 08/19/2024                      Value:This result contains rich text formatting which cannot be displayed here.    Recommendations/Comments 08/19/2024                      Value:This result contains rich text formatting which cannot be displayed here.    Procedure 08/19/2024                      Value:This result contains rich text formatting which cannot be displayed here.    Clinical Information 08/19/2024                      Value:This result contains rich text formatting which cannot be displayed here.    Reason for testing 08/19/2024 Screening     Gyn Additional Informati*  08/19/2024                      Value:This result contains rich text formatting which cannot be displayed here.    Case Report 08/19/2024                      Value:Gynecologic Cytology                              Case: [id number]                                  Authorizing Provider:  Joyce Pearce MD        Collected:           08/19/2024 01:14 PM          Ordering Location:     [facility]    Received:            08/20/2024 11:37 AM                                 [facility] - OB/GYN                                                            First Screen:          Chani Mendez                                                                     Specimen:    ThinPrep Imager Screening Pap, Cervical/endocervical                                         REVIEWED THIS VISIT  ASSESSMENT/PLAN:   59 year old female with    1. Insomnia, unspecified type  - pt intolerant to Citalopram and tried on Sertraline and Paroxetine in the past  - wants to stop taking Clonazepam eventually (she continues to take Clonazepam 1 mg every other day for now)  - Hydroxyzine not helping with sleep   - was tried on Lunesta- not covered by insurance  - start Zolpidem 5 mg at bedtime prn  -  DO NOT take with Clonzepam or drink EtOH on these medications)  - medication f-u in 3 months    - zolpidem 5 MG Oral Tab; Take 1 tablet (5 mg total) by mouth nightly as needed for Sleep.  Dispense: 30 tablet; Refill: 2    2. Anxiety and depression  - is on meds as listed below  - mood is stable  - medication f-u in 3 months     - clonazePAM 1 MG Oral Tab; Take 1 tablet (1 mg total) by mouth daily as needed for Anxiety.  Dispense: 30 tablet; Refill: 2  - cyclobenzaprine 10 MG Oral Tab; Take 1 tablet (10 mg total) by mouth nightly as needed for Muscle spasms.  Dispense: 30 tablet; Refill: 1    3. Need for vaccination    - INFLUENZA VACCINE, TRI,  PRESERV FREE, 0.5 ML  - ZOSTER VACC RECOMBINANT IM NJX      Meds This Visit:  Requested Prescriptions     Signed Prescriptions Disp Refills    clonazePAM 1 MG Oral Tab 30 tablet 2     Sig: Take 1 tablet (1 mg total) by mouth daily as needed for Anxiety.    cyclobenzaprine 10 MG Oral Tab 30 tablet 1     Sig: Take 1 tablet (10 mg total) by mouth nightly as needed for Muscle spasms.    zolpidem 5 MG Oral Tab 30 tablet 2     Sig: Take 1 tablet (5 mg total) by mouth nightly as needed for Sleep.       Health Maintenance:  Health Maintenance   Topic Date Due    COVID-19 Vaccine (3 - 2023-24 season) 09/01/2024    Pneumococcal Vaccine: Birth to 64yrs (2 of 2 - PCV) 10/18/2024 (Originally 9/2/2021)    Annual Physical  06/12/2025    Mammogram  06/25/2025    DTaP,Tdap,and Td Vaccines (2 - Td or Tdap) 01/23/2028    Colorectal Cancer Screening  02/20/2028    Pap Smear  08/19/2029    Influenza Vaccine  Completed    Annual Depression Screening  Completed    Tobacco Cessation Counseling  Completed    Zoster Vaccines  Completed         Patient/Caregiver Education: There are no barriers to learning. Medical education done.   Outcome: Patient verbalizes understanding and agrees with plan. Advised to call or RTC if symptoms persist or worsen.    Problem List:     Patient Active Problem List   Diagnosis    Hot flash, menopausal    Leiomyoma of uterus    Anxiety and depression    Insomnia    Female infertility    Anemia    Grief reaction       Imaging & Referrals:  INFLUENZA VACCINE, TRI, PRESERV FREE, 0.5 ML  ZOSTER VACC RECOMBINANT IM NJX     10/17/2024  Sena Johnston MD      Patient understands plan and follow-up.  Return in about 3 months (around 1/17/2025) for sleep and anxiety medication f-u in 3 months.              [1] No Known Allergies

## 2024-10-24 ENCOUNTER — TELEPHONE (OUTPATIENT)
Dept: FAMILY MEDICINE CLINIC | Facility: CLINIC | Age: 59
End: 2024-10-24

## 2024-10-24 NOTE — TELEPHONE ENCOUNTER
Clonazepam prior authorization completed on covermymeds. Waiting on insurance determination     Lora Rowland (Key: [id number])  PA Case ID #: [id number]  Rx #: [id number]

## 2024-11-19 DIAGNOSIS — F32.A ANXIETY AND DEPRESSION: ICD-10-CM

## 2024-11-19 DIAGNOSIS — G47.00 INSOMNIA, UNSPECIFIED TYPE: ICD-10-CM

## 2024-11-19 DIAGNOSIS — F41.9 ANXIETY AND DEPRESSION: ICD-10-CM

## 2024-11-21 RX ORDER — CYCLOBENZAPRINE HCL 10 MG
10 TABLET ORAL NIGHTLY PRN
Qty: 30 TABLET | Refills: 0 | Status: SHIPPED | OUTPATIENT
Start: 2024-11-21

## 2024-11-21 RX ORDER — CLONAZEPAM 1 MG/1
1 TABLET ORAL DAILY PRN
Qty: 30 TABLET | Refills: 0 | Status: CANCELLED | OUTPATIENT
Start: 2024-11-21

## 2024-11-21 RX ORDER — ZOLPIDEM TARTRATE 5 MG/1
5 TABLET ORAL NIGHTLY PRN
Qty: 30 TABLET | Refills: 0 | Status: CANCELLED | OUTPATIENT
Start: 2024-11-21

## 2024-11-21 NOTE — TELEPHONE ENCOUNTER
Pt requesting refill of   Requested Prescriptions     Pending Prescriptions Disp Refills    clonazePAM 1 MG Oral Tab 30 tablet 0     Sig: Take 1 tablet (1 mg total) by mouth daily as needed for Anxiety.    cyclobenzaprine 10 MG Oral Tab 30 tablet 0     Sig: Take 1 tablet (10 mg total) by mouth nightly as needed for Muscle spasms.    zolpidem 5 MG Oral Tab 30 tablet 0     Sig: Take 1 tablet (5 mg total) by mouth nightly as needed for Sleep.     Last Time Medication was Filled:    Clonazepam 10/17/2024   Cyclobenzaprine 10/17/2024  Zolpidem 10/17/2024    Last Office Visit with Provider: 10/17/2024      Return in about 3 months (around 1/17/2025) for sleep and anxiety medication f-u in 3 months.  No future appointments.     with patient

## 2024-11-21 NOTE — TELEPHONE ENCOUNTER
See my last OV note from 10/2024:  - I had sent  the Clonzepam and Zolpidem w/ refills:  - clonazepam I sent with 1 refill  - zolpidem I sent with 2 refills      Can you please confirm with pharmacy she has refill available before I send more (since these are controlled substances).    I filled the cyclobenzaprine just now. This is ok to fill.    Thanks.

## 2024-11-22 NOTE — TELEPHONE ENCOUNTER
Spoke with patient and she informed me she picked up her prescription and has enough supply. Does not needs medication at this time.    Closing encounter

## 2025-02-08 DIAGNOSIS — G47.00 INSOMNIA, UNSPECIFIED TYPE: ICD-10-CM

## 2025-02-08 DIAGNOSIS — F41.9 ANXIETY AND DEPRESSION: ICD-10-CM

## 2025-02-08 DIAGNOSIS — F32.A ANXIETY AND DEPRESSION: ICD-10-CM

## 2025-02-11 DIAGNOSIS — F41.9 ANXIETY AND DEPRESSION: ICD-10-CM

## 2025-02-11 DIAGNOSIS — F32.A ANXIETY AND DEPRESSION: ICD-10-CM

## 2025-02-12 RX ORDER — CYCLOBENZAPRINE HCL 10 MG
10 TABLET ORAL NIGHTLY PRN
Qty: 30 TABLET | Refills: 0 | OUTPATIENT
Start: 2025-02-12

## 2025-02-12 RX ORDER — CLONAZEPAM 1 MG/1
1 TABLET ORAL DAILY PRN
Qty: 30 TABLET | Refills: 2 | OUTPATIENT
Start: 2025-02-12

## 2025-02-12 RX ORDER — ZOLPIDEM TARTRATE 5 MG/1
5 TABLET ORAL NIGHTLY PRN
Qty: 30 TABLET | Refills: 2 | OUTPATIENT
Start: 2025-02-12

## 2025-02-12 NOTE — TELEPHONE ENCOUNTER
Refill(s) Requested:   Requested Prescriptions     Pending Prescriptions Disp Refills    clonazePAM 1 MG Oral Tab 30 tablet 2     Sig: Take 1 tablet (1 mg total) by mouth daily as needed for Anxiety.    zolpidem 5 MG Oral Tab 30 tablet 2     Sig: Take 1 tablet (5 mg total) by mouth nightly as needed for Sleep.    cyclobenzaprine 10 MG Oral Tab 30 tablet 0     Sig: Take 1 tablet (10 mg total) by mouth nightly as needed for Muscle spasms.     Medication Last Prescribed:    Clonazepam 1 mg 10/17/24 30 days 2 refills  Cyclobenzaprine 10 mg 11/21/24 30 days 0 refills  Zolpidem 5 mg 10/17/2024 30 days 2 refills     Has dose or medication been changed    since last prescription? *Review notes*    []  Yes       [x]  No     Last office visit: 10/17/2024 (in-office)  2/15/2024 (virtual visit)     Relevant details from LOV note:    Insomnia, unspecified type  - pt intolerant to Citalopram and tried on Sertraline and Paroxetine in the past  - wants to stop taking Clonazepam eventually (she continues to take Clonazepam 1 mg every other day for now)  - Hydroxyzine not helping with sleep   - was tried on Lunesta- not covered by insurance  - start Zolpidem 5 mg at bedtime prn  -  DO NOT take with Clonzepam or drink EtOH on these medications)  - medication f-u in 3 months     Relevant lab results: N/A    Patient was asked to follow-up in: 3 months    Appointment due: January 2025    Future Appointments: Visit date not found     Action taken:  [x] Patient is overdue for follow up appointment [ ] Controlled Substance

## 2025-02-28 ENCOUNTER — OFFICE VISIT (OUTPATIENT)
Dept: FAMILY MEDICINE CLINIC | Facility: CLINIC | Age: 60
End: 2025-02-28
Payer: MEDICAID

## 2025-02-28 VITALS
HEIGHT: 61 IN | SYSTOLIC BLOOD PRESSURE: 110 MMHG | RESPIRATION RATE: 18 BRPM | TEMPERATURE: 98 F | HEART RATE: 75 BPM | DIASTOLIC BLOOD PRESSURE: 70 MMHG | WEIGHT: 129 LBS | BODY MASS INDEX: 24.35 KG/M2 | OXYGEN SATURATION: 98 %

## 2025-02-28 DIAGNOSIS — M62.830 MUSCLE SPASM OF BACK: ICD-10-CM

## 2025-02-28 DIAGNOSIS — G47.00 INSOMNIA, UNSPECIFIED TYPE: Primary | ICD-10-CM

## 2025-02-28 DIAGNOSIS — Z23 NEED FOR VACCINATION: ICD-10-CM

## 2025-02-28 DIAGNOSIS — F41.9 ANXIETY AND DEPRESSION: ICD-10-CM

## 2025-02-28 DIAGNOSIS — F32.A ANXIETY AND DEPRESSION: ICD-10-CM

## 2025-02-28 PROCEDURE — 99214 OFFICE O/P EST MOD 30 MIN: CPT | Performed by: FAMILY MEDICINE

## 2025-02-28 PROCEDURE — 90677 PCV20 VACCINE IM: CPT | Performed by: FAMILY MEDICINE

## 2025-02-28 PROCEDURE — 90471 IMMUNIZATION ADMIN: CPT | Performed by: FAMILY MEDICINE

## 2025-02-28 RX ORDER — ZOLPIDEM TARTRATE 5 MG/1
5 TABLET ORAL NIGHTLY PRN
Qty: 30 TABLET | Refills: 2 | Status: SHIPPED | OUTPATIENT
Start: 2025-02-28

## 2025-02-28 RX ORDER — CLONAZEPAM 1 MG/1
1 TABLET ORAL DAILY PRN
Qty: 30 TABLET | Refills: 2 | Status: SHIPPED | OUTPATIENT
Start: 2025-02-28

## 2025-02-28 RX ORDER — CYCLOBENZAPRINE HCL 10 MG
10 TABLET ORAL NIGHTLY PRN
Qty: 30 TABLET | Refills: 2 | Status: SHIPPED | OUTPATIENT
Start: 2025-02-28

## 2025-02-28 NOTE — PROGRESS NOTES
CHIEF COMPLAINT:   Chief Complaint   Patient presents with    Medication Follow-Up         HPI:     Lora Rowland is a 59 year old female presents for anxiety and insomnia medication f-u.    Anxiety and depression f-u:  Pt has not been sleeping well the last few weeks since she ran out of medication, was unable to come in for f-u sooner.  She had been taking care of her mother who is s/p knee replacement.  She will be traveling to [place] to help her dtr who recently had a baby, will be staying there x 2 months.  She states when she carries her grandchildren she often gets back pain and spasms- the Cyclobenzaprine has been helpful at night time for this. She was in [place] recently and since she had run out of medication, the doctor there Rx'd her Alprazolam 1.0 mg for sleep.  She is requesting refill of this.    Previous HPI: Pt states she is doing well on her current medications.  She uses Zolpidem prn, not every night.  She also takes Clonazepam, but takes this every other day prn anxiety.  She never takes the 2 medications together. She has been sleeping well lately. She will be travelling to [employer] touring [place] and surrounding area with her friends. Then she travels to      Previous HPI:  Pt has been struggling with sleep and insomnia.  Was Rx'd Lunesta at previous visit, but this is not covered by insurance.  She was also tried on Trazodone, but states this is not working for her.  She states that the Zolpidem worked well for her before.  She avoids caffeine late in the day. She exercises regularly with yoga.  She has a regular sleep schedule, asleep by 9-10pm and awake by 7a.      Previous HPI from 2/2024: Pt continues to have difficulty with falling asleep.  She was tried on Zolpidem 5 mg at previous visit, but states this is not working. She thought this was what her friend gave her, but turns out her friend told her it is Lunesta 3 mg.  She uses the Conzepam 1 mg to help manage her anxiety, but uses this  only every few days and does not take this with the sleeping medication.     Previous HPI: At previous visit was tried on Paroxetine, but pt states she did not notice any improvement. She takes the Hydroxyzine in the daytime, finds this helpful, She states this is not helping her sleep (50 mg Hydroxyzine). She takes the Clonazepam every other day.  She has difficulty with falling asleep, wakes often through the night.  If she has no medication, she gets only 2-3 hrs of sleep.  She exercises regularly, yoga, sleeps conssitently at 9p and wakes at 7a.  She continues to travel with a group of friends touring different countries. During one of the trips a friend let her try Zolpidem and pt states she had the best slepe after taking that medication.     Previous HPI from 10/2023: Pt states she tried the Sertraline as Rx'd last week, but pt states this made her fingers tingle so she stopped this and went back to taking Clonazepam.  She reports this medication works so much better.  She states also that Hydroxyzine has been a little helpful.  She is leaving for [place] next week and would like something for her upcoming trip.     Previous HPI: Pt had done 1 month without medication- she has been traveling a lot to [place], then to [place].  She notes feeling very anxious on the Citalopram. She also ha snot taken the Clonazepam, wants to stop this medication. She is open to trying a new medication.  She has gained about 3 lbs.  Appetite is good.  Sleep has been poor, due to jet lag.  She will be traveling to [facility] next week. She has no SI and no plan.     Previous HPI from 5/2023:  She was first dx'd shortly after she became a  2 yrs ago.  Her  passed away unexpectedly in a tragic accident 2 yrs ago.  Then she lost her home.  She had a decreased appetite, lost 10 lbs. Was having difficulty sleeping.  She had been seeing Psycholgist, Dr. Sneed, as well as in therapy.  She has been  On Citalopram and  Clonazepam nightly, tolerating well.  She has no SI currently and no plan.                       HISTORY:  Past Medical History:    Anemia    Myoma      Past Surgical History:   Procedure Laterality Date          Colonoscopy      Colonoscopy N/A 2018    Procedure: COLONOSCOPY;  Surgeon: BECCA Bhat MD;  Location: [facility] ENDOSCOPY    D & c      Tubal ligation        Family History   Problem Relation Age of Onset    Hypertension Mother     Uterine Cancer Mother     Hypertension Father     Thyroid Disorder Sister     Breast Cancer Neg     Colon Cancer Neg       Social History:   Social History     Socioeconomic History    Marital status:    Tobacco Use    Smoking status: Every Day     Current packs/day: 0.33     Average packs/day: 0.3 packs/day for 25.2 years (8.3 ttl pk-yrs)     Types: Cigarettes     Start date:     Smokeless tobacco: Never    Tobacco comments:     3 cigs per day    Vaping Use    Vaping status: Never Used   Substance and Sexual Activity    Alcohol use: Not Currently     Alcohol/week: 0.0 standard drinks of alcohol     Comment: socially    Drug use: Yes     Types: Cannabis     Comment: CBD oil fat night     Sexual activity: Yes     Partners: Male     Birth control/protection: Tubal Ligation   Other Topics Concern    Caffeine Concern Yes     Comment: 1 cup coffee daily   Social History Narrative    The patient does not use an assistive device..      The patient does live in a home with stairs.        Medications (Active prior to today's visit):  Current Outpatient Medications   Medication Sig Dispense Refill    zolpidem 5 MG Oral Tab Take 1 tablet (5 mg total) by mouth nightly as needed for Sleep. 30 tablet 2    cyclobenzaprine 10 MG Oral Tab Take 1 tablet (10 mg total) by mouth nightly as needed for Muscle spasms. 30 tablet 2    clonazePAM 1 MG Oral Tab Take 1 tablet (1 mg total) by mouth daily as needed for Anxiety. 30 tablet 2        Allergies:  Allergies[1]    PSFH elements reviewed from today and agreed except as otherwise stated in HPI.  ROS:     Review of Systems   Constitutional:  Negative for appetite change and fatigue.   Musculoskeletal:  Positive for back pain and myalgias.   Psychiatric/Behavioral:  Positive for sleep disturbance. Negative for decreased concentration and dysphoric mood. The patient is not nervous/anxious.          Pertinent positives and negatives noted in the the HPI.    PHYSICAL EXAM:   /70 (BP Location: Left arm, Patient Position: Sitting, Cuff Size: adult)   Pulse 75   Temp 98.2 °F (36.8 °C) (Temporal)   Resp 18   Ht 5' 1\" (1.549 m)   Wt 129 lb (58.5 kg)   LMP 12/23/2017 (Approximate)   SpO2 98%   BMI 24.37 kg/m²   Vital signs reviewed.Appears stated age, well groomed.  Physical Exam  Vitals reviewed.   Constitutional:       Appearance: Normal appearance.   HENT:      Head: Normocephalic.   Cardiovascular:      Rate and Rhythm: Normal rate and regular rhythm.      Pulses: Normal pulses.      Heart sounds: Normal heart sounds.   Pulmonary:      Effort: Pulmonary effort is normal.      Breath sounds: Normal breath sounds.   Skin:     General: Skin is warm and dry.   Neurological:      Mental Status: She is alert and oriented to person, place, and time.   Psychiatric:         Mood and Affect: Mood normal.         Behavior: Behavior normal.         Thought Content: Thought content normal.         Judgment: Judgment normal.          LABS     No visits with results within 2 Month(s) from this visit.   Latest known visit with results is:   Office Visit on 08/19/2024   Component Date Value    HPV High Risk 08/19/2024 Negative     HPV Source 08/19/2024 Cervical/endocervical     Interpretation/Result 08/19/2024 Negative for intraepithelial lesion or malignancy     Other Findings 08/19/2024 Atrophic pattern.     Specimen Adequacy 08/19/2024 Satisfactory for evaluation. Endocervical or metaplastic cells  present     General Categorization 08/19/2024 Negative for intraepithelial lesion or malignancy     HPV High Risk mRNA 08/19/2024                      Value:Negative                          Normal      Recommendations/Comments 08/19/2024                      Value:Screened by the Thin[facility] System and a cytotechnologist.    Procedure 08/19/2024                      Value:Monolayers:  1, specimen collected in Thinprep vial, 20 ml Preservcyt    Clinical Information 08/19/2024                      Value:Z01.419 Well Woman Exam With Routine Gynecological Exam.       Reason for testing 08/19/2024 Screening     Gyn Additional Informati* 08/19/2024                      Value:NOTE:  The Pap smear is a screening test that aids in the detection of                           cervical cancer and its precursors.  False negative and false positive                           results can occur. Regular sampling is recommended to minimize false                           negative results.    Case Report 08/19/2024                      Value:Gynecologic Cytology                              Case: [id number]                                  Authorizing Provider:  Joyce Pearce MD        Collected:           08/19/2024 01:14 PM          Ordering Location:     [facility]    Received:            08/20/2024 11:37 AM                                 [facility] - OB/GYN                                                            First Screen:          Chani Mendez                                                                     Specimen:    ThinPrep Imager Screening Pap, Cervical/endocervical                                         REVIEWED THIS VISIT  ASSESSMENT/PLAN:   59 year old female with    1. Insomnia, unspecified type  - pt intolerant to Citalopram and tried on Sertraline and Paroxetine in the past  - wants to stop  taking Clonazepam eventually (she continues to take Clonazepam 1 mg every other day for now)  - Hydroxyzine not helping with sleep   - was tried on Lunesta- not covered by insurance  - cont  Zolpidem 5 mg at bedtime prn  -  DO NOT take with Clonzepam or drink EtOH on these medications)  - explained to pt that Alprazolam is not appropriate for insomnia and not preferred since it is rapid-acting, which may increase likelihood of  being addicted/habit forming  - medication f-u in 3 months    - zolpidem 5 MG Oral Tab; Take 1 tablet (5 mg total) by mouth nightly as needed for Sleep.  Dispense: 30 tablet; Refill: 2    2. Anxiety and depression  - is on meds as listed below  - mood is stable  - medication f-u in 3 months    - clonazePAM 1 MG Oral Tab; Take 1 tablet (1 mg total) by mouth daily as needed for Anxiety.  Dispense: 30 tablet; Refill: 2    3. Muscle spasm of back  - Cyclobenzaprine at bedtime prn  - supportive care d/w pt    - cyclobenzaprine 10 MG Oral Tab; Take 1 tablet (10 mg total) by mouth nightly as needed for Muscle spasms.  Dispense: 30 tablet; Refill: 2    4. Need for vaccination    - Prevnar 20 (PCV20) [75618]     The patient and provider have a longitudinal relationship to address/treat the serious or   complex condition(s) as stated in this encounter.      Meds This Visit:  Requested Prescriptions     Signed Prescriptions Disp Refills    zolpidem 5 MG Oral Tab 30 tablet 2     Sig: Take 1 tablet (5 mg total) by mouth nightly as needed for Sleep.    cyclobenzaprine 10 MG Oral Tab 30 tablet 2     Sig: Take 1 tablet (10 mg total) by mouth nightly as needed for Muscle spasms.    clonazePAM 1 MG Oral Tab 30 tablet 2     Sig: Take 1 tablet (1 mg total) by mouth daily as needed for Anxiety.       Health Maintenance:  Health Maintenance   Topic Date Due    Pneumococcal Vaccine: 50+ Years (2 of 2 - PCV) 09/02/2021    COVID-19 Vaccine (3 - 2024-25 season) 09/01/2024    Tobacco Cessation Counseling  01/01/2025     Annual Physical  06/12/2025    Mammogram  06/25/2025    DTaP,Tdap,and Td Vaccines (2 - Td or Tdap) 01/23/2028    Colorectal Cancer Screening  02/20/2028    Pap Smear  08/19/2029    Influenza Vaccine  Completed    Annual Depression Screening  Completed    Zoster Vaccines  Completed    Meningococcal B Vaccine  Aged Out         Patient/Caregiver Education: There are no barriers to learning. Medical education done.   Outcome: Patient verbalizes understanding and agrees with plan. Advised to call or RTC if symptoms persist or worsen.    Problem List:     Patient Active Problem List   Diagnosis    Hot flash, menopausal    Leiomyoma of uterus    Anxiety and depression    Insomnia    Female infertility    Anemia    Grief reaction       Imaging & Referrals:  PCV20 VACCINE FOR INTRAMUSCULAR USE     2/28/2025  Sena Johnston MD      Patient understands plan and follow-up.  Return for annual physical due in June 2025; medication follow up in 3 months.              [1] No Known Allergies

## 2025-03-22 ENCOUNTER — TELEPHONE (OUTPATIENT)
Dept: FAMILY MEDICINE CLINIC | Facility: CLINIC | Age: 60
End: 2025-03-22

## 2025-03-22 DIAGNOSIS — F32.A ANXIETY AND DEPRESSION: ICD-10-CM

## 2025-03-22 DIAGNOSIS — G47.00 INSOMNIA, UNSPECIFIED TYPE: ICD-10-CM

## 2025-03-22 DIAGNOSIS — M62.830 MUSCLE SPASM OF BACK: ICD-10-CM

## 2025-03-22 DIAGNOSIS — F41.9 ANXIETY AND DEPRESSION: ICD-10-CM

## 2025-03-24 RX ORDER — CYCLOBENZAPRINE HCL 10 MG
10 TABLET ORAL NIGHTLY PRN
Qty: 30 TABLET | Refills: 0 | Status: CANCELLED | OUTPATIENT
Start: 2025-03-24

## 2025-03-24 RX ORDER — CLONAZEPAM 1 MG/1
1 TABLET ORAL DAILY PRN
Qty: 30 TABLET | Refills: 0 | Status: CANCELLED | OUTPATIENT
Start: 2025-03-24

## 2025-03-24 RX ORDER — ZOLPIDEM TARTRATE 5 MG/1
5 TABLET ORAL NIGHTLY PRN
Qty: 30 TABLET | Refills: 0 | Status: CANCELLED | OUTPATIENT
Start: 2025-03-24

## 2025-03-24 NOTE — TELEPHONE ENCOUNTER
If I'm reading this right, I sent in Rx's for these on 2/28/25 with 2 refills each (to last her 3 months total) before her next f-u.    Please confirm with pharmacy they have these on file before I send in a new Rx.    Thanks.

## 2025-03-24 NOTE — TELEPHONE ENCOUNTER
Refill(s) Requested:   Requested Prescriptions     Pending Prescriptions Disp Refills    zolpidem 5 MG Oral Tab 30 tablet 0     Sig: Take 1 tablet (5 mg total) by mouth nightly as needed for Sleep.    cyclobenzaprine 10 MG Oral Tab 30 tablet 0     Sig: Take 1 tablet (10 mg total) by mouth nightly as needed for Muscle spasms.    clonazePAM 1 MG Oral Tab 30 tablet 0     Sig: Take 1 tablet (1 mg total) by mouth daily as needed for Anxiety.     Medication Last Prescribed:   Clonazepam 2/28/25  Cyclobenzaprine 2/28/25  Zolpidem 2/28/2025      Has dose or medication been changed    since last prescription? *Review notes*    []  Yes       [x]  No     Last office visit: 2/28/2025 (in-office)  Visit date not found (virtual visit)     Relevant details from LOV note: Insomnia, unspecified type  - pt intolerant to Citalopram and tried on Sertraline and Paroxetine in the past  - wants to stop taking Clonazepam eventually (she continues to take Clonazepam 1 mg every other day for now)  - Hydroxyzine not helping with sleep   - was tried on Lunesta- not covered by insurance  - cont  Zolpidem 5 mg at bedtime prn  -  DO NOT take with Clonzepam or drink EtOH on these medications)  - explained to pt that Alprazolam is not appropriate for insomnia and not preferred since it is rapid-acting, which may increase likelihood of  being addicted/habit forming  - medication f-u in 3 months     Patient was asked to follow-up in: Return for annual physical due in June 2025; medication follow up in 3 months.     Appointment due: June 2025    Future Appointments: Visit date not found     Action taken:  [x] Request routed to provider for review - [ ] Controlled Substance

## 2025-03-28 NOTE — TELEPHONE ENCOUNTER
Pt is calling stating that she switched to pharmacy in [facility]. Pt states she has not received a call back nor is her medication there

## 2025-05-16 DIAGNOSIS — F41.9 ANXIETY AND DEPRESSION: ICD-10-CM

## 2025-05-16 DIAGNOSIS — F32.A ANXIETY AND DEPRESSION: ICD-10-CM

## 2025-05-16 DIAGNOSIS — G47.00 INSOMNIA, UNSPECIFIED TYPE: ICD-10-CM

## 2025-05-16 DIAGNOSIS — M62.830 MUSCLE SPASM OF BACK: ICD-10-CM

## 2025-05-20 DIAGNOSIS — F32.A ANXIETY AND DEPRESSION: ICD-10-CM

## 2025-05-20 DIAGNOSIS — M62.830 MUSCLE SPASM OF BACK: ICD-10-CM

## 2025-05-20 DIAGNOSIS — F41.9 ANXIETY AND DEPRESSION: ICD-10-CM

## 2025-05-20 DIAGNOSIS — G47.00 INSOMNIA, UNSPECIFIED TYPE: ICD-10-CM

## 2025-05-20 RX ORDER — ZOLPIDEM TARTRATE 5 MG/1
5 TABLET ORAL NIGHTLY PRN
Qty: 30 TABLET | Refills: 2 | Status: CANCELLED | OUTPATIENT
Start: 2025-05-20

## 2025-05-20 RX ORDER — CYCLOBENZAPRINE HCL 10 MG
10 TABLET ORAL NIGHTLY PRN
Qty: 30 TABLET | Refills: 2 | Status: CANCELLED | OUTPATIENT
Start: 2025-05-20

## 2025-05-20 RX ORDER — CLONAZEPAM 1 MG/1
1 TABLET ORAL DAILY PRN
Qty: 30 TABLET | Refills: 2 | Status: CANCELLED | OUTPATIENT
Start: 2025-05-20

## 2025-05-20 NOTE — TELEPHONE ENCOUNTER
Refill Request Action taken:  [x] Request routed to provider for review - [ ] Medication not on protocol    Refill(s) Requested:   Requested Prescriptions     Pending Prescriptions Disp Refills    zolpidem 5 MG Oral Tab 30 tablet 2     Sig: Take 1 tablet (5 mg total) by mouth nightly as needed for Sleep.    cyclobenzaprine 10 MG Oral Tab 30 tablet 2     Sig: Take 1 tablet (10 mg total) by mouth nightly as needed for Muscle spasms.    clonazePAM 1 MG Oral Tab 30 tablet 2     Sig: Take 1 tablet (1 mg total) by mouth daily as needed for Anxiety.      Has dose or medication been changed    since last prescription? *Review notes*    []  Yes       [x]  No     Last office visit: 2/28/2025 (in-office)  Visit date not found (virtual visit)     Relevant details from LOV note:   Muscle spasm of back  - Cyclobenzaprine at bedtime prn  - supportive care d/w pt    Insomnia, unspecified type  - pt intolerant to Citalopram and tried on Sertraline and Paroxetine in the past  - wants to stop taking Clonazepam eventually (she continues to take Clonazepam 1 mg every other day for now)  - Hydroxyzine not helping with sleep   - was tried on Lunesta- not covered by insurance  - cont  Zolpidem 5 mg at bedtime prn  -  DO NOT take with Clonzepam or drink EtOH on these medications)  - explained to pt that Alprazolam is not appropriate for insomnia and not preferred since it is rapid-acting, which may increase likelihood of  being addicted/habit forming  - medication f-u in 3 months    Relevant lab results: N/A    Appointment due: June 2025    Future Appointments: Visit date not found

## 2025-05-21 RX ORDER — ZOLPIDEM TARTRATE 5 MG/1
5 TABLET ORAL NIGHTLY PRN
Qty: 30 TABLET | Refills: 0 | Status: SHIPPED | OUTPATIENT
Start: 2025-05-21

## 2025-05-21 RX ORDER — CLONAZEPAM 1 MG/1
1 TABLET ORAL DAILY PRN
Qty: 30 TABLET | Refills: 0 | Status: SHIPPED | OUTPATIENT
Start: 2025-05-21

## 2025-05-21 RX ORDER — CYCLOBENZAPRINE HCL 10 MG
10 TABLET ORAL NIGHTLY PRN
Qty: 30 TABLET | Refills: 0 | Status: SHIPPED | OUTPATIENT
Start: 2025-05-21

## 2025-06-12 DIAGNOSIS — F32.A ANXIETY AND DEPRESSION: ICD-10-CM

## 2025-06-12 DIAGNOSIS — G47.00 INSOMNIA, UNSPECIFIED TYPE: ICD-10-CM

## 2025-06-12 DIAGNOSIS — F41.9 ANXIETY AND DEPRESSION: ICD-10-CM

## 2025-06-12 DIAGNOSIS — M62.830 MUSCLE SPASM OF BACK: ICD-10-CM

## 2025-06-13 RX ORDER — ZOLPIDEM TARTRATE 5 MG/1
5 TABLET ORAL NIGHTLY PRN
Qty: 30 TABLET | Refills: 0 | OUTPATIENT
Start: 2025-06-13

## 2025-06-13 RX ORDER — CLONAZEPAM 1 MG/1
1 TABLET ORAL DAILY PRN
Qty: 30 TABLET | Refills: 0 | OUTPATIENT
Start: 2025-06-13

## 2025-06-13 RX ORDER — CYCLOBENZAPRINE HCL 10 MG
10 TABLET ORAL NIGHTLY PRN
Qty: 30 TABLET | Refills: 0 | OUTPATIENT
Start: 2025-06-13

## 2025-06-13 NOTE — TELEPHONE ENCOUNTER
Please review; protocol failed/ has no protocol      Fill dates for Clonazepam     Recent fills: 05/21/2025,03/28/2025,02/11/2025  Last Rx written: 05/21/2025  Last Office Visit: 02/28/2025    Recent Visits  Date Type Provider Dept   02/28/25 Office Visit Sena Johnston MD [facility]       Fill dates for Zolpidem     Recent fills: 05/21/2025,03/28/2025,02/11/2025  Last Rx written: 02/28/2025  Last Office Visit: 02/28/2025    Recent Visits  Date Type Provider Dept   02/28/25 Office Visit Sena Johnston MD [facility]

## 2025-06-13 NOTE — TELEPHONE ENCOUNTER
Since meds are controlled substances, needs 3 month f-u appt.  Last seen in office 2/2025. Please schedule.    Thanks.      See below:  . Insomnia, unspecified type  - pt intolerant to Citalopram and tried on Sertraline and Paroxetine in the past  - wants to stop taking Clonazepam eventually (she continues to take Clonazepam 1 mg every other day for now)  - Hydroxyzine not helping with sleep   - was tried on Lunesta- not covered by insurance  - cont  Zolpidem 5 mg at bedtime prn  -  DO NOT take with Clonzepam or drink EtOH on these medications)  - explained to pt that Alprazolam is not appropriate for insomnia and not preferred since it is rapid-acting, which may increase likelihood of  being addicted/habit forming  - medication f-u in 3 months     - zolpidem 5 MG Oral Tab; Take 1 tablet (5 mg total) by mouth nightly as needed for Sleep.  Dispense: 30 tablet; Refill: 2     2. Anxiety and depression  - is on Clonazepam, since prefers to not   Take daily maintenance medication (tried and failed Setraline and Venlafaxine in the past)  - mood is stable  - medication f-u in 3 months

## 2025-06-16 ENCOUNTER — OFFICE VISIT (OUTPATIENT)
Dept: FAMILY MEDICINE CLINIC | Facility: CLINIC | Age: 60
End: 2025-06-16
Payer: MEDICAID

## 2025-06-16 VITALS
SYSTOLIC BLOOD PRESSURE: 100 MMHG | BODY MASS INDEX: 23.38 KG/M2 | HEIGHT: 61 IN | DIASTOLIC BLOOD PRESSURE: 60 MMHG | OXYGEN SATURATION: 95 % | RESPIRATION RATE: 18 BRPM | TEMPERATURE: 98 F | WEIGHT: 123.81 LBS | HEART RATE: 72 BPM

## 2025-06-16 DIAGNOSIS — Z13.228 SCREENING FOR ENDOCRINE, NUTRITIONAL, METABOLIC AND IMMUNITY DISORDER: ICD-10-CM

## 2025-06-16 DIAGNOSIS — G47.00 INSOMNIA, UNSPECIFIED TYPE: ICD-10-CM

## 2025-06-16 DIAGNOSIS — F32.A ANXIETY AND DEPRESSION: ICD-10-CM

## 2025-06-16 DIAGNOSIS — Z13.0 SCREENING FOR ENDOCRINE, NUTRITIONAL, METABOLIC AND IMMUNITY DISORDER: ICD-10-CM

## 2025-06-16 DIAGNOSIS — Z13.6 SCREENING FOR ISCHEMIC HEART DISEASE: ICD-10-CM

## 2025-06-16 DIAGNOSIS — F41.9 ANXIETY AND DEPRESSION: ICD-10-CM

## 2025-06-16 DIAGNOSIS — M62.830 MUSCLE SPASM OF BACK: ICD-10-CM

## 2025-06-16 DIAGNOSIS — Z13.21 SCREENING FOR ENDOCRINE, NUTRITIONAL, METABOLIC AND IMMUNITY DISORDER: ICD-10-CM

## 2025-06-16 DIAGNOSIS — Z00.00 ANNUAL PHYSICAL EXAM: Primary | ICD-10-CM

## 2025-06-16 DIAGNOSIS — Z13.29 SCREENING FOR ENDOCRINE, NUTRITIONAL, METABOLIC AND IMMUNITY DISORDER: ICD-10-CM

## 2025-06-16 DIAGNOSIS — Z12.31 ENCOUNTER FOR SCREENING MAMMOGRAM FOR MALIGNANT NEOPLASM OF BREAST: ICD-10-CM

## 2025-06-16 DIAGNOSIS — Z71.6 ENCOUNTER FOR TOBACCO USE CESSATION COUNSELING: ICD-10-CM

## 2025-06-16 PROCEDURE — 99396 PREV VISIT EST AGE 40-64: CPT | Performed by: FAMILY MEDICINE

## 2025-06-16 RX ORDER — CLONAZEPAM 1 MG/1
1 TABLET ORAL DAILY PRN
Qty: 30 TABLET | Refills: 2 | Status: SHIPPED | OUTPATIENT
Start: 2025-06-16

## 2025-06-16 RX ORDER — CYCLOBENZAPRINE HCL 10 MG
10 TABLET ORAL NIGHTLY PRN
Qty: 30 TABLET | Refills: 1 | Status: SHIPPED | OUTPATIENT
Start: 2025-06-16

## 2025-06-16 RX ORDER — ZOLPIDEM TARTRATE 5 MG/1
5 TABLET ORAL NIGHTLY PRN
Qty: 30 TABLET | Refills: 2 | Status: SHIPPED | OUTPATIENT
Start: 2025-06-16

## 2025-06-16 NOTE — PROGRESS NOTES
Chief Complaint   Patient presents with    Physical     Annual exam w/o pap        HPI:   Lora Rowland is a 59 year old female who presents for a complete physical without gyne exam.   Patient feels well, dental visit up to date, no hearing problem.  Vaccinations up to date.    LMP: postmenopause  Sexual activity:not discussed  Contraception:postmenopausal  Exercise: yoga.  Diet: regular    Wt Readings from Last 3 Encounters:   06/16/25 123 lb 12.8 oz (56.2 kg)   02/28/25 129 lb (58.5 kg)   10/17/24 124 lb 12.8 oz (56.6 kg)      BP Readings from Last 3 Encounters:   06/16/25 100/60   02/28/25 110/70   10/17/24 116/74     Patient's last menstrual period was 12/23/2017 (approximate).     LMP: menopause  Sexually Active: not discussed  Last pap smear: 8/2024, w/ Gyne Dr. LYNNETTE Pearce  Last mammogram: 6/2024, ordered new for 2025  Last colonoscopy: 2/2018 (rpt in 10 yrs)  Last DEXA Scan: n/a     Exercise: yoga  Diet: regular     Medication f-u: Her medication regimen includes clonazepam, cyclobenzaprine, and zolpidem. She occasionally uses additional cyclobenzaprine for back pain, which provides significant relief (see below)    Anxiety and depression f-u: Pt states she is doing well on her current medications.  She uses Zolpidem prn, not every night.  She also takes Clonazepam, but takes this every other day prn anxiety.  She never takes the 2 medications together. She has been sleeping well lately.     Previous HPI:  Pt has been struggling with sleep and insomnia.  Was Rx'd Lunesta at previous visit, but this is not covered by insurance.  She was also tried on Trazodone, but states this is not working for her.  She states that the Zolpidem worked well for her before.  She avoids caffeine late in the day. She exercises regularly with yoga.  She has a regular sleep schedule, asleep by 9-10pm and awake by 7a.      Previous HPI from 2/2024: Pt continues to have difficulty with falling asleep.  She was tried on Zolpidem 5  mg at previous visit, but states this is not working. She thought this was what her friend gave her, but turns out her friend told her it is Lunesta 3 mg.  She uses the Conzepam 1 mg to help manage her anxiety, but uses this only every few days and does not take this with the sleeping medication.     Previous HPI: At previous visit was tried on Paroxetine, but pt states she did not notice any improvement. She takes the Hydroxyzine in the daytime, finds this helpful, She states this is not helping her sleep (50 mg Hydroxyzine). She takes the Clonazepam every other day.  She has difficulty with falling asleep, wakes often through the night.  If she has no medication, she gets only 2-3 hrs of sleep.  She exercises regularly, yoga, sleeps conssitently at 9p and wakes at 7a.  She continues to travel with a group of friends touring different countries. During one of the trips a friend let her try Zolpidem and pt states she had the best slepe after taking that medication.     Previous HPI from 10/2023: Pt states she tried the Sertraline as Rx'd last week, but pt states this made her fingers tingle so she stopped this and went back to taking Clonazepam.  She reports this medication works so much better.  She states also that Hydroxyzine has been a little helpful.  She is leaving for [place] next week and would like something for her upcoming trip.     Previous HPI: Pt had done 1 month without medication- she has been traveling a lot to [place], then to [place].  She notes feeling very anxious on the Citalopram. She also ha snot taken the Clonazepam, wants to stop this medication. She is open to trying a new medication.  She has gained about 3 lbs.  Appetite is good.  Sleep has been poor, due to jet lag.  She will be traveling to [place] next week. She has no SI and no plan.     Previous HPI from 5/2023:  She was first dx'd shortly after she became a  2 yrs ago.  Her  passed away unexpectedly in a tragic accident  2 yrs ago.  Then she lost her home.  She had a decreased appetite, lost 10 lbs. Was having difficulty sleeping.  She had been seeing Psycholgist, Dr. Sneed, as well as in therapy.  She has been  On Citalopram and Clonazepam nightly, tolerating well.  She has no SI currently and no plan.                  History of Present Illness    She is attempting to quit smoking due to the high cost of cigarettes, which range from $15 to $20 per pack. She is motivated to quit to save money.      She is planning to travel to [place] on July 6th to take care of her granddaughter. Her diet is regular, and she remains active. No problems with urination or bowel movements. No swelling, and her blood pressure is reported to be perfect.        Current Medications[1]   Past Medical History[2]   Past Surgical History[3]   Family History[4]   Social History:  Short Social Hx on File[5]    Allergies:  Allergies[6]     REVIEW OF SYSTEMS:     Review of Systems   As noted per HPI  EXAM:   /60 (BP Location: Left arm, Patient Position: Sitting, Cuff Size: adult)   Pulse 72   Temp 98.4 °F (36.9 °C) (Temporal)   Resp 18   Ht 5' 1\" (1.549 m)   Wt 123 lb 12.8 oz (56.2 kg)   LMP 12/23/2017 (Approximate)   SpO2 95%   BMI 23.39 kg/m²    GENERAL: WD/WN in no acute distress.   HEENT: PERRLA and EOMI.  OP moist no lesions.TM WNL, sheila.Normal ears canals bilaterally.  Neck is supple, with no cervical LAD or thyroid abnormalities.  LUNGS: are clear to auscultation bilaterally, with no wheeze, rhonchi, or rales.   HEART: is RRR.  S1, S2, with no murmurs,clicks, gallops  BREAST:deferred  ABDOMEN: is soft,NBS, NT/ND with no HSM.  No rebound or guarding. No CVA tenderness, no hernias.   EXAM: deferred.  RECTAL EXAM: deferred  NEURO: Cranial nerves II-XII normal,no focal abnormalities, and reflexes coordination and gait normal and symmetric.Sensation intact.  EXTREMETIES: are symmetric with no cyanosis, clubbing, or edema.  MS: Normal muscles  tones, no joints abnormalities.  SKIN: Normal color, turgor, no lesions, rashes or wounds.  PSYCH: normal affect and mood.    ASSESSMENT AND PLAN:     59 year old female with     1. Annual physical exam  Routine labs ordered today, await results. Counseled pt on healthy lifestyle changes. Vaccines today: UTD . Contraception: postmenopausal .     Last pap smear: 8/2024, w/ Gyne Dr. LYNNETTE Pearce  Last mammogram: 6/2024, ordered new for 2025  Last colonoscopy: 2/2018 (rpt in 10 yrs)  Last DEXA Scan: n/a    - CBC With Differential With Platelet  - Comp Metabolic Panel  - Lipid Panel  - TSH W Reflex To Free T4    2. Anxiety and depression  - is on meds as listed below  - mood is stable  - medication f-u in 3 months     - clonazePAM 1 MG Oral Tab; Take 1 tablet (1 mg total) by mouth daily as needed for Anxiety.  Dispense: 30 tablet; Refill: 2    3. Insomnia, unspecified type  - pt intolerant to Citalopram and tried on Sertraline and Paroxetine in the past  - wants to stop taking Clonazepam eventually (she continues to take Clonazepam 1 mg every other day for now)  - Hydroxyzine not helping with sleep   - was tried on Lunesta- not covered by insurance  - cont  Zolpidem 5 mg at bedtime prn  -  DO NOT take with Clonzepam or drink EtOH on these medications)  - explained to pt that Alprazolam is not appropriate for insomnia and not preferred since it is rapid-acting, which may increase likelihood of  being addicted/habit forming  - medication f-u in 3 months    - zolpidem 5 MG Oral Tab; Take 1 tablet (5 mg total) by mouth nightly as needed for Sleep.  Dispense: 30 tablet; Refill: 2    4. Muscle spasm of back  - Cyclobenzaprine at bedtime prn  - supportive care d/w pt    - cyclobenzaprine 10 MG Oral Tab; Take 1 tablet (10 mg total) by mouth nightly as needed for Muscle spasms.  Dispense: 30 tablet; Refill: 1    5. Screening for ischemic heart disease    - Lipid Panel    6. Screening for endocrine, nutritional, metabolic and  immunity disorder    - CBC With Differential With Platelet  - Comp Metabolic Panel  - TSH W Reflex To Free T4    7. Encounter for screening mammogram for malignant neoplasm of breast    - [facility] TIMOTHY 2D+3D SCREENING BILAT (CPT=77067/56282); Future    8. Encounter for tobacco use cessation counseling  - smoking cessation d/w pt, not ready to quit yet, but has cut down    - Smoking Cessation less than 3 minutes     The patient and provider have a longitudinal relationship to address/treat the serious or   complex condition(s) as stated in this encounter.         Pt's was recommended low fat diet and aerobic exercise 30 minutes three times weekly.   Health maintenance.   Osteoporosis prevention addressed  Recommended whole food type diet, eliminate processed food/low sugar and low sat fat diet      The patient indicates understanding of these issues and agrees to the plan.    Return in about 3 months (around 2025) for anxiety, insomnia medication f-u.       [1]   Current Outpatient Medications   Medication Sig Dispense Refill    clonazePAM 1 MG Oral Tab Take 1 tablet (1 mg total) by mouth daily as needed for Anxiety. 30 tablet 2    cyclobenzaprine 10 MG Oral Tab Take 1 tablet (10 mg total) by mouth nightly as needed for Muscle spasms. 30 tablet 1    zolpidem 5 MG Oral Tab Take 1 tablet (5 mg total) by mouth nightly as needed for Sleep. 30 tablet 2   [2]   Past Medical History:   Anemia    Myoma   [3]   Past Surgical History:  Procedure Laterality Date          Colonoscopy      Colonoscopy N/A 2018    Procedure: COLONOSCOPY;  Surgeon: BECCA Bhat MD;  Location: [facility] ENDOSCOPY    D & c      Tubal ligation     [4]   Family History  Problem Relation Age of Onset    Hypertension Mother     Uterine Cancer Mother     Hypertension Father     Thyroid Disorder Sister     Breast Cancer Neg     Colon Cancer Neg    [5]   Social History  Socioeconomic History    Marital status:    Tobacco Use     Smoking status: Every Day     Current packs/day: 0.33     Average packs/day: 0.3 packs/day for 25.5 years (8.4 ttl pk-yrs)     Types: Cigarettes     Start date: 2000    Smokeless tobacco: Never    Tobacco comments:     3 cigs per day    Vaping Use    Vaping status: Never Used   Substance and Sexual Activity    Alcohol use: Not Currently     Alcohol/week: 0.0 standard drinks of alcohol     Comment: socially    Drug use: Yes     Types: Cannabis     Comment: CBD oil fat night     Sexual activity: Yes     Partners: Male     Birth control/protection: Tubal Ligation   Other Topics Concern    Caffeine Concern Yes     Comment: 1 cup coffee daily   Social History Narrative    The patient does not use an assistive device..      The patient does live in a home with stairs.   [6] No Known Allergies

## 2025-06-17 LAB
ABSOLUTE BASOPHILS: 63 CELLS/UL (ref 0–200)
ABSOLUTE EOSINOPHILS: 462 CELLS/UL (ref 15–500)
ABSOLUTE LYMPHOCYTES: 2933 CELLS/UL (ref 850–3900)
ABSOLUTE MONOCYTES: 420 CELLS/UL (ref 200–950)
ABSOLUTE NEUTROPHILS: 3122 CELLS/UL (ref 1500–7800)
ALBUMIN/GLOBULIN RATIO: 1.6 (CALC) (ref 1–2.5)
ALBUMIN: 4.5 G/DL (ref 3.6–5.1)
ALKALINE PHOSPHATASE: 84 U/L (ref 37–153)
ALT: 36 U/L (ref 6–29)
AST: 41 U/L (ref 10–35)
BASOPHILS: 0.9 %
BILIRUBIN, TOTAL: 0.3 MG/DL (ref 0.2–1.2)
BUN: 11 MG/DL (ref 7–25)
CALCIUM: 9.9 MG/DL (ref 8.6–10.4)
CARBON DIOXIDE: 28 MMOL/L (ref 20–32)
CHLORIDE: 106 MMOL/L (ref 98–110)
CHOL/HDLC RATIO: 2.4 (CALC)
CHOLESTEROL, TOTAL: 230 MG/DL
CREATININE: 0.71 MG/DL (ref 0.5–1.03)
EGFR: 99 ML/MIN/1.73M2
EOSINOPHILS: 6.6 %
GLOBULIN: 2.9 G/DL (CALC) (ref 1.9–3.7)
GLUCOSE: 79 MG/DL (ref 65–99)
HDL CHOLESTEROL: 97 MG/DL
HEMATOCRIT: 42.3 % (ref 35–45)
HEMOGLOBIN: 13.6 G/DL (ref 11.7–15.5)
LDL-CHOLESTEROL: 113 MG/DL (CALC)
LYMPHOCYTES: 41.9 %
MCH: 30.6 PG (ref 27–33)
MCHC: 32.2 G/DL (ref 32–36)
MCV: 95.1 FL (ref 80–100)
MONOCYTES: 6 %
MPV: 11 FL (ref 7.5–12.5)
NEUTROPHILS: 44.6 %
NON-HDL CHOLESTEROL: 133 MG/DL (CALC)
PLATELET COUNT: 331 THOUSAND/UL (ref 140–400)
POTASSIUM: 4.6 MMOL/L (ref 3.5–5.3)
PROTEIN, TOTAL: 7.4 G/DL (ref 6.1–8.1)
RDW: 13 % (ref 11–15)
RED BLOOD CELL COUNT: 4.45 MILLION/UL (ref 3.8–5.1)
SODIUM: 143 MMOL/L (ref 135–146)
TRIGLYCERIDES: 95 MG/DL
TSH W/REFLEX TO FT4: 1.19 MIU/L (ref 0.4–4.5)
WHITE BLOOD CELL COUNT: 7 THOUSAND/UL (ref 3.8–10.8)

## 2025-07-16 DIAGNOSIS — M62.830 MUSCLE SPASM OF BACK: ICD-10-CM

## 2025-07-16 DIAGNOSIS — G47.00 INSOMNIA, UNSPECIFIED TYPE: ICD-10-CM

## 2025-07-16 DIAGNOSIS — F32.A ANXIETY AND DEPRESSION: ICD-10-CM

## 2025-07-16 DIAGNOSIS — F41.9 ANXIETY AND DEPRESSION: ICD-10-CM

## 2025-07-16 RX ORDER — CYCLOBENZAPRINE HCL 10 MG
10 TABLET ORAL NIGHTLY PRN
Qty: 30 TABLET | Refills: 1 | Status: CANCELLED | OUTPATIENT
Start: 2025-07-16

## 2025-07-16 RX ORDER — CLONAZEPAM 1 MG/1
1 TABLET ORAL DAILY PRN
Qty: 30 TABLET | Refills: 2 | Status: CANCELLED | OUTPATIENT
Start: 2025-07-16

## 2025-07-16 RX ORDER — ZOLPIDEM TARTRATE 5 MG/1
5 TABLET ORAL NIGHTLY PRN
Qty: 30 TABLET | Refills: 2 | Status: CANCELLED | OUTPATIENT
Start: 2025-07-16

## 2025-07-21 NOTE — TELEPHONE ENCOUNTER
Please vierfy with pharmacy, since when I sent Rx on 6/16/25, I sent 2 refills of meds, as well. (Clonazepam and Zolpidem)    Thanks.

## 2025-07-23 ENCOUNTER — HOSPITAL ENCOUNTER (OUTPATIENT)
Dept: MAMMOGRAPHY | Facility: HOSPITAL | Age: 60
Discharge: HOME | End: 2025-07-23
Attending: FAMILY MEDICINE
Payer: MEDICAID

## 2025-07-23 DIAGNOSIS — Z12.31 ENCOUNTER FOR SCREENING MAMMOGRAM FOR MALIGNANT NEOPLASM OF BREAST: ICD-10-CM

## 2025-07-23 PROCEDURE — 77067 SCR MAMMO BI INCL CAD: CPT | Performed by: FAMILY MEDICINE

## 2025-07-23 PROCEDURE — 77063 BREAST TOMOSYNTHESIS BI: CPT | Performed by: FAMILY MEDICINE

## 2025-07-23 NOTE — TELEPHONE ENCOUNTER
Called over to [facility] pharmacy in [place] and verified if pt. has refills left for medications being requested.    [facility] states pt. last picked up Clonazepam and Zolpidem on 07/20/25, and Cyclobenzaprine in June 2025. [facility] verified all medications have 1 refill available.    [facility]

## 2025-08-22 DIAGNOSIS — F41.9 ANXIETY AND DEPRESSION: ICD-10-CM

## 2025-08-22 DIAGNOSIS — F32.A ANXIETY AND DEPRESSION: ICD-10-CM

## 2025-08-22 DIAGNOSIS — M62.830 MUSCLE SPASM OF BACK: ICD-10-CM

## 2025-08-22 DIAGNOSIS — G47.00 INSOMNIA, UNSPECIFIED TYPE: ICD-10-CM

## 2025-08-27 RX ORDER — ZOLPIDEM TARTRATE 5 MG/1
5 TABLET ORAL NIGHTLY PRN
Qty: 30 TABLET | Refills: 2 | OUTPATIENT
Start: 2025-08-27

## 2025-08-27 RX ORDER — CYCLOBENZAPRINE HCL 10 MG
10 TABLET ORAL NIGHTLY PRN
Qty: 30 TABLET | Refills: 1 | Status: SHIPPED | OUTPATIENT
Start: 2025-08-27

## 2025-08-27 RX ORDER — CLONAZEPAM 1 MG/1
1 TABLET ORAL DAILY PRN
Qty: 30 TABLET | Refills: 2 | OUTPATIENT
Start: 2025-08-27

## (undated) DEVICE — ENDOSCOPY PACK - LOWER: Brand: MEDLINE INDUSTRIES, INC.

## (undated) DEVICE — Device: Brand: DEFENDO AIR/WATER/SUCTION AND BIOPSY VALVE

## (undated) NOTE — MR AVS SNAPSHOT
After Visit Summary   12/5/2019    Jeff Sparrow    MRN: [id number]           Visit Information     Date & Time  12/5/2019  1:00 PM Provider  Erin Fuentes, [address] [place] [address], [address] [place] [address],[address], [facility].  Phone  33 Thank you for enrolling in [facility]. Please follow the instructions below to securely access your online medical record. [employer] allows you to send messages to your doctor, view test results, renew prescriptions and request appointments.     How Do I Sign Up If you receive a survey from [employer], please take a few minutes to complete it and provide feedback. We strive to deliver the best patient experience and are looking for ways to make improvements. Your feedback will help us do so.  For more information

## (undated) NOTE — LETTER
[address]   Date:   10/24/2019     Name:   Livan Traylor    YOB: 1965   MRN:   [id number]       WHERE IS YOUR PAIN NOW? Bridger the areas on your body where you feel the described sensations.   Use the appropriate s

## (undated) NOTE — LETTER
10/24/19          Aviva Job  :  1965      To Whom It May Concern: This patient was seen in our office on 10/24/19. She may work light duty restrictions - no lifting, pushing, pulling greater than 10 pounds for the next two weeks.        If

## (undated) NOTE — LETTER
4/1/2019          To Whom It May Concern:    Aurea Mortensenchapito is currently under my medical care and may return to work at this time. Please excuse Tal Hernandez for any time missed due to acute upper and mid back pain.  She may return but should not raise her a

## (undated) NOTE — LETTER
9/9/2020              [employer]        [address] [id number]         Dear Olga Pickering,    We had previously informed you that you were due July 2020 for a left diag mamm and left breast ultrasound in six months.  You had in Jaret

## (undated) NOTE — LETTER
AUTHORIZATION FOR SURGICAL OPERATION OR OTHER PROCEDURE    1.  I hereby authorize Dr. Lory Lefort and the [facility] staff assigned to my case to perform the following operation and/or procedure at the [facility]:    Bilateral upper trapezius, rhomboid trigger Patient signature:  ___________________________________________________             Relationship to Patient:           []  Parent    Responsible person                          []  Spouse  In case of minor or                    [] Other  _____________   In

## (undated) NOTE — LETTER
Lupis [address] 39   Date:   8/1/2019     Name:   Kait Hurd    YOB: 1965   MRN:   [id number]       WHERE IS YOUR PAIN NOW? Bridger the areas on your body where you feel the described sensations.   Use the appropriate sym

## (undated) NOTE — LETTER
12/10/2019              Aurea Singer        [address]         Dear Tal Hernandez,    It was a pleasure to see you.   Your Pap and HPV was neg, repeat pap needed in 3 years, return to clinic 1 year for annual exam. There is no

## (undated) NOTE — MR AVS SNAPSHOT
[address], [address]  [address] [facility]  [phone]               Thank you for choosing us for your health care visit with Jennifer Brizuela MD.  We are glad to serve you and happy to provide you with this summa

## (undated) NOTE — LETTER
[address]up[address]   Date:   6/27/2019     Name:   Gagan Rios    YOB: 1965   MRN:   [id number]       WHERE IS YOUR PAIN NOW? Bridger the areas on your body where you feel the described sensations.   Use the appropriate sy

## (undated) NOTE — LETTER
12/10/2019              Dior Fuentes        [address] [facility]         Dear Betsey Pacheco,    It was a pleasure to see you.  Your Pap and HPV was negative, repeat pap needed in 3 years, return to clinic 1 year for annual exam.  There i